# Patient Record
Sex: FEMALE | Race: WHITE
[De-identification: names, ages, dates, MRNs, and addresses within clinical notes are randomized per-mention and may not be internally consistent; named-entity substitution may affect disease eponyms.]

---

## 2018-05-07 ENCOUNTER — HOSPITAL ENCOUNTER (OUTPATIENT)
Dept: HOSPITAL 8 - STAR | Age: 38
End: 2018-05-07
Attending: OBSTETRICS & GYNECOLOGY
Payer: COMMERCIAL

## 2018-05-07 VITALS — DIASTOLIC BLOOD PRESSURE: 59 MMHG | SYSTOLIC BLOOD PRESSURE: 97 MMHG

## 2018-05-07 DIAGNOSIS — Z01.818: Primary | ICD-10-CM

## 2018-05-07 LAB
BASOPHILS # BLD AUTO: 0.01 X10^3/UL (ref 0–0.1)
BASOPHILS NFR BLD AUTO: 0 % (ref 0–1)
CULTURE INDICATED?: YES
EOSINOPHIL # BLD AUTO: 0.1 X10^3/UL (ref 0–0.4)
EOSINOPHIL NFR BLD AUTO: 1 % (ref 1–7)
ERYTHROCYTE [DISTWIDTH] IN BLOOD BY AUTOMATED COUNT: 12.7 % (ref 9.6–15.2)
HCG UR SG: 1.01 (ref 1–1.03)
LYMPHOCYTES # BLD AUTO: 1.78 X10^3/UL (ref 1–3.4)
LYMPHOCYTES NFR BLD AUTO: 23 % (ref 22–44)
MCH RBC QN AUTO: 29.6 PG (ref 27–34.8)
MCHC RBC AUTO-ENTMCNC: 33.6 G/DL (ref 32.4–35.8)
MCV RBC AUTO: 88.1 FL (ref 80–100)
MD: NO
MICROSCOPIC: (no result)
MONOCYTES # BLD AUTO: 0.6 X10^3/UL (ref 0.2–0.8)
MONOCYTES NFR BLD AUTO: 8 % (ref 2–9)
NEUTROPHILS # BLD AUTO: 5.17 X10^3/UL (ref 1.8–6.8)
NEUTROPHILS NFR BLD AUTO: 68 % (ref 42–75)
PLATELET # BLD AUTO: 223 X10^3/UL (ref 130–400)
PMV BLD AUTO: 8.1 FL (ref 7.4–10.4)
RBC # BLD AUTO: 4.98 X10^6/UL (ref 3.82–5.3)

## 2018-05-07 PROCEDURE — 85025 COMPLETE CBC W/AUTO DIFF WBC: CPT

## 2018-05-07 PROCEDURE — 81001 URINALYSIS AUTO W/SCOPE: CPT

## 2018-05-07 PROCEDURE — 36415 COLL VENOUS BLD VENIPUNCTURE: CPT

## 2018-05-07 PROCEDURE — 87086 URINE CULTURE/COLONY COUNT: CPT

## 2018-05-07 PROCEDURE — 81025 URINE PREGNANCY TEST: CPT

## 2018-05-17 ENCOUNTER — HOSPITAL ENCOUNTER (OUTPATIENT)
Dept: HOSPITAL 8 - OUT | Age: 38
Discharge: HOME | End: 2018-05-17
Attending: OBSTETRICS & GYNECOLOGY
Payer: COMMERCIAL

## 2018-05-17 VITALS — BODY MASS INDEX: 26.02 KG/M2 | WEIGHT: 175.71 LBS | HEIGHT: 69 IN

## 2018-05-17 DIAGNOSIS — N80.9: ICD-10-CM

## 2018-05-17 DIAGNOSIS — N92.0: Primary | ICD-10-CM

## 2018-05-17 DIAGNOSIS — N94.6: ICD-10-CM

## 2018-05-17 PROCEDURE — 85014 HEMATOCRIT: CPT

## 2018-05-17 PROCEDURE — 36415 COLL VENOUS BLD VENIPUNCTURE: CPT

## 2018-05-17 PROCEDURE — 85018 HEMOGLOBIN: CPT

## 2018-05-17 PROCEDURE — 86900 BLOOD TYPING SEROLOGIC ABO: CPT

## 2018-05-17 PROCEDURE — 88307 TISSUE EXAM BY PATHOLOGIST: CPT

## 2018-05-17 PROCEDURE — 58552 LAPARO-VAG HYST INCL T/O: CPT

## 2018-05-17 PROCEDURE — 86850 RBC ANTIBODY SCREEN: CPT

## 2022-05-05 ENCOUNTER — HOSPITAL ENCOUNTER (OUTPATIENT)
Dept: LAB | Facility: MEDICAL CENTER | Age: 42
End: 2022-05-05
Attending: FAMILY MEDICINE
Payer: COMMERCIAL

## 2022-05-05 LAB
ALBUMIN SERPL BCP-MCNC: 4.4 G/DL (ref 3.2–4.9)
ALBUMIN/GLOB SERPL: 1.6 G/DL
ALP SERPL-CCNC: 80 U/L (ref 30–99)
ALT SERPL-CCNC: 15 U/L (ref 2–50)
ANION GAP SERPL CALC-SCNC: 10 MMOL/L (ref 7–16)
AST SERPL-CCNC: 16 U/L (ref 12–45)
BASOPHILS # BLD AUTO: 0.7 % (ref 0–1.8)
BASOPHILS # BLD: 0.04 K/UL (ref 0–0.12)
BILIRUB SERPL-MCNC: 0.6 MG/DL (ref 0.1–1.5)
BUN SERPL-MCNC: 13 MG/DL (ref 8–22)
CALCIUM SERPL-MCNC: 9.1 MG/DL (ref 8.5–10.5)
CHLORIDE SERPL-SCNC: 106 MMOL/L (ref 96–112)
CO2 SERPL-SCNC: 23 MMOL/L (ref 20–33)
CREAT SERPL-MCNC: 0.8 MG/DL (ref 0.5–1.4)
EOSINOPHIL # BLD AUTO: 0.1 K/UL (ref 0–0.51)
EOSINOPHIL NFR BLD: 1.7 % (ref 0–6.9)
ERYTHROCYTE [DISTWIDTH] IN BLOOD BY AUTOMATED COUNT: 41.3 FL (ref 35.9–50)
GFR SERPLBLD CREATININE-BSD FMLA CKD-EPI: 95 ML/MIN/1.73 M 2
GLOBULIN SER CALC-MCNC: 2.7 G/DL (ref 1.9–3.5)
GLUCOSE SERPL-MCNC: 71 MG/DL (ref 65–99)
HCT VFR BLD AUTO: 46.3 % (ref 37–47)
HGB BLD-MCNC: 15.5 G/DL (ref 12–16)
IMM GRANULOCYTES # BLD AUTO: 0.02 K/UL (ref 0–0.11)
IMM GRANULOCYTES NFR BLD AUTO: 0.3 % (ref 0–0.9)
LYMPHOCYTES # BLD AUTO: 1.8 K/UL (ref 1–4.8)
LYMPHOCYTES NFR BLD: 30.7 % (ref 22–41)
MCH RBC QN AUTO: 29.4 PG (ref 27–33)
MCHC RBC AUTO-ENTMCNC: 33.5 G/DL (ref 33.6–35)
MCV RBC AUTO: 87.9 FL (ref 81.4–97.8)
MONOCYTES # BLD AUTO: 0.48 K/UL (ref 0–0.85)
MONOCYTES NFR BLD AUTO: 8.2 % (ref 0–13.4)
NEUTROPHILS # BLD AUTO: 3.43 K/UL (ref 2–7.15)
NEUTROPHILS NFR BLD: 58.4 % (ref 44–72)
NRBC # BLD AUTO: 0 K/UL
NRBC BLD-RTO: 0 /100 WBC
PLATELET # BLD AUTO: 250 K/UL (ref 164–446)
PMV BLD AUTO: 10.1 FL (ref 9–12.9)
POTASSIUM SERPL-SCNC: 4.3 MMOL/L (ref 3.6–5.5)
PROT SERPL-MCNC: 7.1 G/DL (ref 6–8.2)
RBC # BLD AUTO: 5.27 M/UL (ref 4.2–5.4)
SODIUM SERPL-SCNC: 139 MMOL/L (ref 135–145)
WBC # BLD AUTO: 5.9 K/UL (ref 4.8–10.8)

## 2022-05-05 PROCEDURE — 85025 COMPLETE CBC W/AUTO DIFF WBC: CPT

## 2022-05-05 PROCEDURE — 83013 H PYLORI (C-13) BREATH: CPT

## 2022-05-05 PROCEDURE — 36415 COLL VENOUS BLD VENIPUNCTURE: CPT

## 2022-05-05 PROCEDURE — 80053 COMPREHEN METABOLIC PANEL: CPT

## 2022-05-06 LAB — UREA BREATH TEST QL: NEGATIVE

## 2024-05-10 ENCOUNTER — OFFICE VISIT (OUTPATIENT)
Dept: MEDICAL GROUP | Facility: LAB | Age: 44
End: 2024-05-10
Payer: COMMERCIAL

## 2024-05-10 VITALS
BODY MASS INDEX: 30.21 KG/M2 | WEIGHT: 204 LBS | HEART RATE: 76 BPM | OXYGEN SATURATION: 99 % | RESPIRATION RATE: 16 BRPM | TEMPERATURE: 98.2 F | SYSTOLIC BLOOD PRESSURE: 100 MMHG | HEIGHT: 69 IN | DIASTOLIC BLOOD PRESSURE: 62 MMHG

## 2024-05-10 DIAGNOSIS — E66.09 CLASS 1 OBESITY DUE TO EXCESS CALORIES WITHOUT SERIOUS COMORBIDITY WITH BODY MASS INDEX (BMI) OF 30.0 TO 30.9 IN ADULT: ICD-10-CM

## 2024-05-10 DIAGNOSIS — Z76.89 ENCOUNTER TO ESTABLISH CARE WITH NEW DOCTOR: ICD-10-CM

## 2024-05-10 DIAGNOSIS — Z23 NEED FOR VACCINATION: ICD-10-CM

## 2024-05-10 DIAGNOSIS — Z13.29 SCREENING FOR THYROID DISORDER: ICD-10-CM

## 2024-05-10 DIAGNOSIS — T75.3XXA MOTION SICKNESS, INITIAL ENCOUNTER: ICD-10-CM

## 2024-05-10 DIAGNOSIS — Z11.4 SCREENING FOR HIV WITHOUT PRESENCE OF RISK FACTORS: ICD-10-CM

## 2024-05-10 DIAGNOSIS — Z13.0 SCREENING FOR DEFICIENCY ANEMIA: ICD-10-CM

## 2024-05-10 DIAGNOSIS — R61 EXCESSIVE SWEATING: ICD-10-CM

## 2024-05-10 DIAGNOSIS — Z12.31 ENCOUNTER FOR SCREENING MAMMOGRAM FOR MALIGNANT NEOPLASM OF BREAST: ICD-10-CM

## 2024-05-10 DIAGNOSIS — Z11.59 ENCOUNTER FOR HEPATITIS C SCREENING TEST FOR LOW RISK PATIENT: ICD-10-CM

## 2024-05-10 DIAGNOSIS — Z13.220 SCREENING FOR HYPERLIPIDEMIA: ICD-10-CM

## 2024-05-10 PROBLEM — E66.811 CLASS 1 OBESITY DUE TO EXCESS CALORIES WITHOUT SERIOUS COMORBIDITY WITH BODY MASS INDEX (BMI) OF 30.0 TO 30.9 IN ADULT: Status: ACTIVE | Noted: 2024-05-10

## 2024-05-10 PROCEDURE — 3074F SYST BP LT 130 MM HG: CPT | Performed by: FAMILY MEDICINE

## 2024-05-10 PROCEDURE — 99204 OFFICE O/P NEW MOD 45 MIN: CPT | Mod: 25 | Performed by: FAMILY MEDICINE

## 2024-05-10 PROCEDURE — 3078F DIAST BP <80 MM HG: CPT | Performed by: FAMILY MEDICINE

## 2024-05-10 PROCEDURE — 90715 TDAP VACCINE 7 YRS/> IM: CPT | Performed by: FAMILY MEDICINE

## 2024-05-10 PROCEDURE — 90471 IMMUNIZATION ADMIN: CPT | Performed by: FAMILY MEDICINE

## 2024-05-10 RX ORDER — SCOLOPAMINE TRANSDERMAL SYSTEM 1 MG/1
1 PATCH, EXTENDED RELEASE TRANSDERMAL
Qty: 4 PATCH | Refills: 3 | Status: SHIPPED | OUTPATIENT
Start: 2024-05-10

## 2024-05-10 RX ORDER — ALUMINUM CHLORIDE 20 %
1 SOLUTION, NON-ORAL TOPICAL NIGHTLY
Qty: 60 ML | Refills: 0 | Status: SHIPPED | OUTPATIENT
Start: 2024-05-10

## 2024-05-10 NOTE — PROGRESS NOTES
Chief Complaint   Patient presents with    [facility]       Verbal consent was acquired by the patient to use [employer] ambient listening note generation during this visit Yes      HPI: New patient  History of Present Illness  The patient is a 43-year-old female who presents to [facility].    The patient, previously under the care of Dr. Eyad Pinzon, expresses a desire to discuss her weight.     She reports no current medical issues and is not currently on any prescribed medications. She has no known drug allergies. Her surgical history includes a partial hysterectomy in 2017 or 2018 due to heavy menstrual cycles, with no other surgeries reported. She has not undergone any recent laboratory tests. Her obstetric history includes four pregnancies in her lifetime, with three children aged 18, 16, and 12.     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 She maintains a healthy diet, consumes approximately 80 ounces of water daily, and consumes a high-protein diet.     Her last Pap smear was conducted within the last three months by Dr. Kyraa Hernandez, who has since retired. She is uncertain about the date of her last menstrual period. She has a history of chickenpox.    The patient suffers from hyperhidrosis and has attempted various treatments including deodorants, Drysol, DuoDERMite with elementum, aluminum-free, and medicated wipes, and Botox in her armpits, which provided minimal relief. Her primary concern at present is excessive sweating, necessitating the use of paper towels beneath her bra while teaching. She has previously consulted a dermatologist.    The patient is planning a cruise in 06/2024 and experiences severe motion sickness, for which she requests a prescription for a scopolamine patch.    The patient has a longstanding lymph  "node, which has decreased in size from the size of a quarter to the lowest it has ever been. It occasionally increases in size during illness.   The patient denies smoking, vaping, and alcohol intake.     She is an . She is  and has a partner.   Her father has Heart disease runs on her father's side of the family.   She thinks it is partly hereditary. Her father had 2 heart attacks. Her grandmother  of a heart attack.    The patient has no known allergies.              Exam:     /62 (BP Location: Left arm, Patient Position: Sitting, BP Cuff Size: Adult)   Pulse 76   Temp 36.8 °C (98.2 °F) (Temporal)   Resp 16   Ht 1.753 m (5' 9\")   Wt 92.5 kg (204 lb)   SpO2 99%   BMI 30.13 kg/m²   Gen.: Well-developed, well-nourished, no apparent distress, pleasant and cooperative with the examination  HEENT: Normocephalic/atraumatic, sinuses nontender with palpation, TMs clear, nares patent with pink mucosa and clear rhinorrhea, oropharynx clear  Neck: No JVD or bruits, no adenopathy  Cor: Regular rate and rhythm without murmur gallop or rub  Lungs: Clear to auscultation with equal breath sounds bilaterally. No wheezes, rhonchi.  Abdomen: Soft nontender without hepatosplenomegaly or masses appreciated, normoactive bowel sounds  Extremities: No cyanosis, clubbing or edema       Assessment & Plan    1. Encounter to establish care with new doctor  A comprehensive risk assessment, laboratory tests, and screening for anemia and thyroid will be ordered. The patient will receive her Tdap vaccine today. A referral for a mammogram will be initiated.    2. Class 1 obesity due to excess calories without serious comorbidity with body mass index (BMI) of 30.0 to 30.9 in adult  Chronic   The patient's BMI is recorded at 30. The patient was advised to reduce her BMI below 27. A weight management visit will be scheduled. She will contact her insurance provider regarding weight loss medications.    3. " Excessive sweating  Drysol solution ordered today, discussed side effects    4. Motion sickness,   Planning for cruise, scopolamine patch will be sent to her pharmacy.    5. Need for vaccination    - Tdap =>6yo IM    6. Screening for hyperlipidemia    - Comp Metabolic Panel; Future  - Lipid Profile; Future    7. Screening for deficiency anemia    - CBC WITH DIFFERENTIAL; Future    8. Encounter for hepatitis C screening test for low risk patient    - HEP C VIRUS ANTIBODY; Future    9. Screening for HIV without presence of risk factors    - HIV AG/AB COMBO ASSAY SCREENING; Future    10. Screening for thyroid disorder    - TSH WITH REFLEX TO FT4; Future    11. Encounter for screening mammogram for malignant neoplasm of breast    - MA-SCREENING MAMMO BILAT W/TOMOSYNTHESIS W/CAD; Future        Follow-up  The patient is scheduled for a follow-up visit in 1 month.    Please note that this dictation was created using voice recognition software. I have worked with consultants from the vendor as well as technical experts from [facility] [employer] to optimize the interface. I have made every reasonable attempt to correct obvious errors, but I expect that there are errors of grammar and possibly content that I did not discover before finalizing the note.

## 2024-05-13 ENCOUNTER — APPOINTMENT (OUTPATIENT)
Dept: MEDICAL GROUP | Facility: LAB | Age: 44
End: 2024-05-13
Payer: COMMERCIAL

## 2024-05-16 ENCOUNTER — OFFICE VISIT (OUTPATIENT)
Dept: MEDICAL GROUP | Facility: LAB | Age: 44
End: 2024-05-16
Payer: COMMERCIAL

## 2024-05-16 VITALS — WEIGHT: 204 LBS | HEIGHT: 69 IN | BODY MASS INDEX: 30.21 KG/M2

## 2024-05-16 DIAGNOSIS — E66.09 CLASS 1 OBESITY DUE TO EXCESS CALORIES WITHOUT SERIOUS COMORBIDITY WITH BODY MASS INDEX (BMI) OF 30.0 TO 30.9 IN ADULT: ICD-10-CM

## 2024-05-16 PROCEDURE — 99213 OFFICE O/P EST LOW 20 MIN: CPT | Performed by: FAMILY MEDICINE

## 2024-05-16 NOTE — PROGRESS NOTES
"Chief Complaint   Patient presents with    Weight Check     initial       Verbal consent was acquired by the patient to use [employer] ambient listening note generation during this visit Yes      HPI:Established patient  History of Present Illness  The patient is a 53-year-old female who presents for evaluation of weight gain.  This is first initial visit for weight management.    The patient reports a lifelong history of weight fluctuations. Following her first pregnancy, she experienced  labor at 26 weeks, necessitating bedrest for the majority of her pregnancy. Despite a desire to lose 10 to 15 pounds, she began to regain weight approximately 10 years ago. Following a partial hysterectomy, she noticed a gradual increase in her weight. Despite not participating in any medical weight loss programs, she has attempted weight loss through Weight Watchers, counting macros, regular exercise, CrossFit, and high-protein diet. Her daily diet consists of 1 to 2 portions of carbohydrates and 120 g of protein, supplemented with protein substitutes such as Premier shakes. Her daily caloric intake ranges from 1850 to 173. Her daily exercise routine includes 10,000 steps, hydration, and weightlifting. She maintains an active lifestyle, attending the gym on  and , performing cardio and weightlifting exercises. She denies any specific dietary restrictions or symptoms of sleep apnea.              Exam:     Ht 1.753 m (5' 9\")   Wt 92.5 kg (204 lb) Comment: shirt only- afternoon  BMI 30.13 kg/m²   Gen.: Well-developed, well-nourished, no apparent distress, pleasant and cooperative with the examination  HEENT: Normocephalic/atraumatic, sinuses nontender with palpation, TMs clear, nares patent with pink mucosa and clear rhinorrhea, oropharynx clear  Neck: No JVD or bruits, no adenopathy  Cor: Regular rate and rhythm without murmur gallop or rub  Lungs: Clear to auscultation with equal breath sounds bilaterally. " No wheezes, rhonchi.  Abdomen: Soft nontender without hepatosplenomegaly or masses appreciated, normoactive bowel sounds  Extremities: No cyanosis, clubbing or edema     Assessment & Plan      1. Class 1 obesity due to excess calories without serious comorbidity with body mass index (BMI) of 30.0 to 30.9 in adult     based on her Basal metabolic rate, is approximately 1650Kcal /day . The patient is advised to reduce her caloric intake to 1200 to 1500 calories per day, but not to exceed 1500 misty per day. She is encouraged to maintain her exercise regimen, adhere to a high-protein diet, high-fiber diet, and consume ample non-starchy vegetables. Additionally, she is recommended to incorporate vitamin D and B complex into her regimen. She is encouraged to consume more than 64 ounces of water daily. Furthermore, she is encouraged to engage in more than 150 minutes of exercise per week.    Follow-up  The patient is scheduled for a follow-up visit in 2 weeks to monitor her weight.

## 2024-05-29 ENCOUNTER — TELEPHONE (OUTPATIENT)
Dept: MEDICAL GROUP | Facility: LAB | Age: 44
End: 2024-05-29
Payer: COMMERCIAL

## 2024-05-29 NOTE — TELEPHONE ENCOUNTER
Patient still waiting to hear regarding the request to fill the above referenced script.    Pharmacy:  [facility]    Contact #[phone]

## 2024-05-30 ENCOUNTER — OFFICE VISIT (OUTPATIENT)
Dept: MEDICAL GROUP | Facility: LAB | Age: 44
End: 2024-05-30
Payer: COMMERCIAL

## 2024-05-30 VITALS
RESPIRATION RATE: 16 BRPM | HEART RATE: 98 BPM | BODY MASS INDEX: 29.62 KG/M2 | SYSTOLIC BLOOD PRESSURE: 110 MMHG | DIASTOLIC BLOOD PRESSURE: 70 MMHG | TEMPERATURE: 98 F | HEIGHT: 69 IN | WEIGHT: 200 LBS | OXYGEN SATURATION: 98 %

## 2024-05-30 DIAGNOSIS — E66.09 CLASS 1 OBESITY DUE TO EXCESS CALORIES WITHOUT SERIOUS COMORBIDITY WITH BODY MASS INDEX (BMI) OF 30.0 TO 30.9 IN ADULT: ICD-10-CM

## 2024-05-31 NOTE — PROGRESS NOTES
"Chief Complaint   Patient presents with    Weight Check       Verbal consent was acquired by the patient to use [employer] ambient listening note generation during this visit Yes      HPI:  History of Present Illness  The patient is a 43-year-old female who presents for evaluation of weight management.    Overweight/weight management.  Patient dropped around 4 pounds since last visit by applying the lifestyle changes dietary restrictions high-protein diet, and high water intake along with calorie deficiency.  Was not able to get her GLP-1 agonist medication yet but she would like to have a prescription just to be sent to [facility] pharmacy because she called them and she can afford the generic price of the semaglutide.    The patient is planning a 2.5-week family vacation to [facility] at the end of 06/2024 and is seeking advice on whether she should adhere to her prescribed medication, specifically semaglutide, during her vacation. She expresses concern about the coldness of her medication, particularly during her work hours.              Exam:     /70 (BP Location: Left arm, Patient Position: Sitting, BP Cuff Size: Adult)   Pulse 98   Temp 36.7 °C (98 °F) (Temporal)   Resp 16   Ht 1.753 m (5' 9\")   Wt 90.7 kg (200 lb)   SpO2 98%   BMI 29.53 kg/m²   Gen.: Well-developed, well-nourished, no apparent distress, pleasant and cooperative with the examination  HEENT: Normocephalic/atraumatic, sinuses nontender with palpation, TMs clear, nares patent with pink mucosa and clear rhinorrhea, oropharynx clear  Neck: No JVD or bruits, no adenopathy  Cor: Regular rate and rhythm without murmur gallop or rub  Lungs: Clear to auscultation with equal breath sounds bilaterally. No wheezes, rhonchi.  Abdomen: Soft nontender without hepatosplenomegaly or masses appreciated, normoactive bowel sounds  Extremities: No cyanosis, clubbing or edema     Assessment & Plan    1. Class 1 obesity due to excess calories " without serious comorbidity with body mass index (BMI) of 30.0 to 30.9 in adult  Improved to overweight BMI now patient lost around 4 pounds.  Advised to continue current regimen along with starting semaglutide, patient can skip for 2 weeks because of concerns of steroids and keeping the medication in cool place.  And resume after coming back with the same dose of 0.25 mg weekly and will increase gradually every 4 weeks.  Her laboratory results are satisfactory. A 2-week break from semaglutide was recommended. Dietary modifications, including portion control, high-protein diet, increased water intake, regular exercise, and walking, were recommended.    Follow-up  A follow-up appointment is scheduled for 1 month from now.

## 2024-06-07 ENCOUNTER — HOSPITAL ENCOUNTER (OUTPATIENT)
Dept: RADIOLOGY | Facility: MEDICAL CENTER | Age: 44
End: 2024-06-07
Attending: FAMILY MEDICINE
Payer: COMMERCIAL

## 2024-06-07 DIAGNOSIS — Z12.31 ENCOUNTER FOR SCREENING MAMMOGRAM FOR MALIGNANT NEOPLASM OF BREAST: ICD-10-CM

## 2024-06-07 PROCEDURE — 77063 BREAST TOMOSYNTHESIS BI: CPT

## 2024-06-11 DIAGNOSIS — R92.8 ABNORMAL MAMMOGRAM: ICD-10-CM

## 2024-06-20 ENCOUNTER — HOSPITAL ENCOUNTER (OUTPATIENT)
Dept: RADIOLOGY | Facility: MEDICAL CENTER | Age: 44
End: 2024-06-20
Attending: FAMILY MEDICINE
Payer: COMMERCIAL

## 2024-06-20 DIAGNOSIS — R92.8 ABNORMAL MAMMOGRAM: ICD-10-CM

## 2024-06-20 PROCEDURE — G0279 TOMOSYNTHESIS, MAMMO: HCPCS

## 2024-07-10 ENCOUNTER — HOSPITAL ENCOUNTER (OUTPATIENT)
Dept: RADIOLOGY | Facility: MEDICAL CENTER | Age: 44
End: 2024-07-10
Attending: FAMILY MEDICINE
Payer: COMMERCIAL

## 2024-07-10 DIAGNOSIS — R92.8 ABNORMAL FINDINGS ON DIAGNOSTIC IMAGING OF BREAST: ICD-10-CM

## 2024-07-10 LAB — PATHOLOGY CONSULT NOTE: NORMAL

## 2024-07-10 PROCEDURE — A4648 IMPLANTABLE TISSUE MARKER: HCPCS

## 2024-07-10 PROCEDURE — 88360 TUMOR IMMUNOHISTOCHEM/MANUAL: CPT

## 2024-07-10 PROCEDURE — 88305 TISSUE EXAM BY PATHOLOGIST: CPT

## 2024-07-11 ENCOUNTER — TELEPHONE (OUTPATIENT)
Dept: RADIOLOGY | Facility: MEDICAL CENTER | Age: 44
End: 2024-07-11
Payer: COMMERCIAL

## 2024-07-12 DIAGNOSIS — C50.912 MALIGNANT NEOPLASM OF LEFT BREAST IN FEMALE, ESTROGEN RECEPTOR POSITIVE, UNSPECIFIED SITE OF BREAST (HCC): ICD-10-CM

## 2024-07-12 DIAGNOSIS — Z17.0 MALIGNANT NEOPLASM OF LEFT BREAST IN FEMALE, ESTROGEN RECEPTOR POSITIVE, UNSPECIFIED SITE OF BREAST (HCC): ICD-10-CM

## 2024-07-12 DIAGNOSIS — D05.92 CARCINOMA IN SITU OF LEFT BREAST, UNSPECIFIED TYPE: ICD-10-CM

## 2024-07-22 ENCOUNTER — PATIENT OUTREACH (OUTPATIENT)
Dept: ONCOLOGY | Facility: MEDICAL CENTER | Age: 44
End: 2024-07-22
Payer: COMMERCIAL

## 2024-07-25 ENCOUNTER — OFFICE VISIT (OUTPATIENT)
Dept: SURGERY | Facility: MEDICAL CENTER | Age: 44
End: 2024-07-25
Payer: COMMERCIAL

## 2024-07-25 ENCOUNTER — TELEPHONE (OUTPATIENT)
Dept: SURGERY | Facility: MEDICAL CENTER | Age: 44
End: 2024-07-25

## 2024-07-25 VITALS
HEIGHT: 69 IN | DIASTOLIC BLOOD PRESSURE: 72 MMHG | BODY MASS INDEX: 28.29 KG/M2 | TEMPERATURE: 97.7 F | WEIGHT: 191 LBS | OXYGEN SATURATION: 98 % | SYSTOLIC BLOOD PRESSURE: 102 MMHG | HEART RATE: 89 BPM

## 2024-07-25 DIAGNOSIS — D05.12 DUCTAL CARCINOMA IN SITU (DCIS) OF LEFT BREAST: ICD-10-CM

## 2024-07-25 PROCEDURE — 99205 OFFICE O/P NEW HI 60 MIN: CPT | Performed by: SURGERY

## 2024-07-25 PROCEDURE — 3074F SYST BP LT 130 MM HG: CPT | Performed by: SURGERY

## 2024-07-25 PROCEDURE — G2212 PROLONG OUTPT/OFFICE VIS: HCPCS | Performed by: SURGERY

## 2024-07-25 PROCEDURE — 3078F DIAST BP <80 MM HG: CPT | Performed by: SURGERY

## 2024-07-25 ASSESSMENT — ENCOUNTER SYMPTOMS
EYES NEGATIVE: 1
MUSCULOSKELETAL NEGATIVE: 1
GASTROINTESTINAL NEGATIVE: 1
NEUROLOGICAL NEGATIVE: 1
CARDIOVASCULAR NEGATIVE: 1
PSYCHIATRIC NEGATIVE: 1
CONSTITUTIONAL NEGATIVE: 1
RESPIRATORY NEGATIVE: 1

## 2024-07-26 ENCOUNTER — TELEPHONE (OUTPATIENT)
Dept: SURGERY | Facility: MEDICAL CENTER | Age: 44
End: 2024-07-26
Payer: COMMERCIAL

## 2024-07-29 ENCOUNTER — PATIENT OUTREACH (OUTPATIENT)
Dept: ONCOLOGY | Facility: MEDICAL CENTER | Age: 44
End: 2024-07-29
Payer: COMMERCIAL

## 2024-07-30 ENCOUNTER — TELEPHONE (OUTPATIENT)
Dept: SURGERY | Facility: MEDICAL CENTER | Age: 44
End: 2024-07-30
Payer: COMMERCIAL

## 2024-08-02 ENCOUNTER — APPOINTMENT (OUTPATIENT)
Dept: ADMISSIONS | Facility: MEDICAL CENTER | Age: 44
End: 2024-08-02
Attending: PLASTIC SURGERY
Payer: COMMERCIAL

## 2024-08-06 ENCOUNTER — NON-PROVIDER VISIT (OUTPATIENT)
Dept: GENETICS | Facility: MEDICAL CENTER | Age: 44
End: 2024-08-06
Payer: COMMERCIAL

## 2024-08-06 NOTE — PROGRESS NOTES
Valeria Mason is a 43 y.o. female here for a non-provider visit for: Lab Draws  on 8/6/2024 at 8:56 AM    Procedure Performed: Venipuncture     Anatomical site: Left Antecubital Area (AC)    Equipment used: 25 butterfly    Labs drawn: Adormo (two lavender EDTA tubes)    Ordering Provider: Wizzgo     Jed By: Kalie Munroe, Med Ass't

## 2024-08-08 ENCOUNTER — PRE-ADMISSION TESTING (OUTPATIENT)
Dept: ADMISSIONS | Facility: MEDICAL CENTER | Age: 44
End: 2024-08-08
Attending: PLASTIC SURGERY
Payer: COMMERCIAL

## 2024-08-12 ENCOUNTER — PRE-ADMISSION TESTING (OUTPATIENT)
Dept: ADMISSIONS | Facility: MEDICAL CENTER | Age: 44
End: 2024-08-12
Attending: PLASTIC SURGERY
Payer: COMMERCIAL

## 2024-08-12 ENCOUNTER — DOCUMENTATION (OUTPATIENT)
Dept: SURGERY | Facility: MEDICAL CENTER | Age: 44
End: 2024-08-12
Payer: COMMERCIAL

## 2024-08-12 DIAGNOSIS — Z01.812 PRE-OPERATIVE LABORATORY EXAMINATION: ICD-10-CM

## 2024-08-12 LAB
ANION GAP SERPL CALC-SCNC: 10 MMOL/L (ref 7–16)
BUN SERPL-MCNC: 14 MG/DL (ref 8–22)
CALCIUM SERPL-MCNC: 8.9 MG/DL (ref 8.5–10.5)
CHLORIDE SERPL-SCNC: 106 MMOL/L (ref 96–112)
CO2 SERPL-SCNC: 23 MMOL/L (ref 20–33)
CREAT SERPL-MCNC: 0.95 MG/DL (ref 0.5–1.4)
ERYTHROCYTE [DISTWIDTH] IN BLOOD BY AUTOMATED COUNT: 41.4 FL (ref 35.9–50)
GFR SERPLBLD CREATININE-BSD FMLA CKD-EPI: 76 ML/MIN/1.73 M 2
GLUCOSE SERPL-MCNC: 76 MG/DL (ref 65–99)
HCT VFR BLD AUTO: 41.8 % (ref 37–47)
HGB BLD-MCNC: 14.3 G/DL (ref 12–16)
MCH RBC QN AUTO: 29.7 PG (ref 27–33)
MCHC RBC AUTO-ENTMCNC: 34.2 G/DL (ref 32.2–35.5)
MCV RBC AUTO: 86.7 FL (ref 81.4–97.8)
PLATELET # BLD AUTO: 226 K/UL (ref 164–446)
PMV BLD AUTO: 9.4 FL (ref 9–12.9)
POTASSIUM SERPL-SCNC: 4.5 MMOL/L (ref 3.6–5.5)
RBC # BLD AUTO: 4.82 M/UL (ref 4.2–5.4)
SODIUM SERPL-SCNC: 139 MMOL/L (ref 135–145)
WBC # BLD AUTO: 6.1 K/UL (ref 4.8–10.8)

## 2024-08-12 PROCEDURE — 80048 BASIC METABOLIC PNL TOTAL CA: CPT

## 2024-08-12 PROCEDURE — 85027 COMPLETE CBC AUTOMATED: CPT

## 2024-08-12 PROCEDURE — 36415 COLL VENOUS BLD VENIPUNCTURE: CPT

## 2024-08-14 ENCOUNTER — ANESTHESIA EVENT (OUTPATIENT)
Dept: SURGERY | Facility: MEDICAL CENTER | Age: 44
End: 2024-08-14
Payer: COMMERCIAL

## 2024-08-15 ENCOUNTER — HOSPITAL ENCOUNTER (OUTPATIENT)
Dept: RADIOLOGY | Facility: MEDICAL CENTER | Age: 44
End: 2024-08-15
Attending: SURGERY

## 2024-08-15 ENCOUNTER — HOSPITAL ENCOUNTER (OUTPATIENT)
Facility: MEDICAL CENTER | Age: 44
End: 2024-08-15
Attending: PLASTIC SURGERY | Admitting: PLASTIC SURGERY
Payer: COMMERCIAL

## 2024-08-15 ENCOUNTER — APPOINTMENT (OUTPATIENT)
Dept: RADIOLOGY | Facility: MEDICAL CENTER | Age: 44
End: 2024-08-15
Attending: SURGERY
Payer: COMMERCIAL

## 2024-08-15 ENCOUNTER — ANESTHESIA (OUTPATIENT)
Dept: SURGERY | Facility: MEDICAL CENTER | Age: 44
End: 2024-08-15
Payer: COMMERCIAL

## 2024-08-15 VITALS
RESPIRATION RATE: 20 BRPM | HEIGHT: 69 IN | SYSTOLIC BLOOD PRESSURE: 97 MMHG | TEMPERATURE: 97.2 F | DIASTOLIC BLOOD PRESSURE: 55 MMHG | HEART RATE: 72 BPM | OXYGEN SATURATION: 97 % | BODY MASS INDEX: 28.54 KG/M2 | WEIGHT: 192.68 LBS

## 2024-08-15 DIAGNOSIS — D05.12 INTRADUCTAL CARCINOMA IN SITU OF LEFT BREAST: ICD-10-CM

## 2024-08-15 DIAGNOSIS — D05.12 DUCTAL CARCINOMA IN SITU OF LEFT BREAST: ICD-10-CM

## 2024-08-15 LAB — PATHOLOGY CONSULT NOTE: NORMAL

## 2024-08-15 PROCEDURE — C1889 IMPLANT/INSERT DEVICE, NOC: HCPCS | Performed by: PLASTIC SURGERY

## 2024-08-15 PROCEDURE — 700101 HCHG RX REV CODE 250: Performed by: PLASTIC SURGERY

## 2024-08-15 PROCEDURE — C1762 CONN TISS, HUMAN(INC FASCIA): HCPCS | Performed by: PLASTIC SURGERY

## 2024-08-15 PROCEDURE — 160048 HCHG OR STATISTICAL LEVEL 1-5: Performed by: PLASTIC SURGERY

## 2024-08-15 PROCEDURE — 19303 MAST SIMPLE COMPLETE: CPT | Mod: AS,LT | Performed by: SPECIALIST

## 2024-08-15 PROCEDURE — 38900 IO MAP OF SENT LYMPH NODE: CPT | Mod: AS,LT | Performed by: SPECIALIST

## 2024-08-15 PROCEDURE — 700101 HCHG RX REV CODE 250: Performed by: ANESTHESIOLOGY

## 2024-08-15 PROCEDURE — 38525 BIOPSY/REMOVAL LYMPH NODES: CPT | Mod: AS,LT | Performed by: SPECIALIST

## 2024-08-15 PROCEDURE — 160025 RECOVERY II MINUTES (STATS): Performed by: PLASTIC SURGERY

## 2024-08-15 PROCEDURE — A9270 NON-COVERED ITEM OR SERVICE: HCPCS | Performed by: PLASTIC SURGERY

## 2024-08-15 PROCEDURE — A9270 NON-COVERED ITEM OR SERVICE: HCPCS | Performed by: ANESTHESIOLOGY

## 2024-08-15 PROCEDURE — 38525 BIOPSY/REMOVAL LYMPH NODES: CPT | Mod: LT | Performed by: SURGERY

## 2024-08-15 PROCEDURE — 160047 HCHG PACU  - EA ADDL 30 MINS PHASE II: Performed by: PLASTIC SURGERY

## 2024-08-15 PROCEDURE — 110371 HCHG SHELL REV 272: Performed by: PLASTIC SURGERY

## 2024-08-15 PROCEDURE — 700111 HCHG RX REV CODE 636 W/ 250 OVERRIDE (IP): Performed by: ANESTHESIOLOGY

## 2024-08-15 PROCEDURE — 38900 IO MAP OF SENT LYMPH NODE: CPT | Mod: LT | Performed by: SURGERY

## 2024-08-15 PROCEDURE — 160029 HCHG SURGERY MINUTES - 1ST 30 MINS LEVEL 4: Performed by: PLASTIC SURGERY

## 2024-08-15 PROCEDURE — 700111 HCHG RX REV CODE 636 W/ 250 OVERRIDE (IP): Performed by: SURGERY

## 2024-08-15 PROCEDURE — 700102 HCHG RX REV CODE 250 W/ 637 OVERRIDE(OP): Performed by: ANESTHESIOLOGY

## 2024-08-15 PROCEDURE — 19303 MAST SIMPLE COMPLETE: CPT | Mod: LT | Performed by: SURGERY

## 2024-08-15 PROCEDURE — 700105 HCHG RX REV CODE 258: Performed by: ANESTHESIOLOGY

## 2024-08-15 PROCEDURE — 160046 HCHG PACU - 1ST 60 MINS PHASE II: Performed by: PLASTIC SURGERY

## 2024-08-15 PROCEDURE — 38792 RA TRACER ID OF SENTINL NODE: CPT | Mod: LT

## 2024-08-15 PROCEDURE — 160041 HCHG SURGERY MINUTES - EA ADDL 1 MIN LEVEL 4: Performed by: PLASTIC SURGERY

## 2024-08-15 PROCEDURE — 160036 HCHG PACU - EA ADDL 30 MINS PHASE I: Performed by: PLASTIC SURGERY

## 2024-08-15 PROCEDURE — 700105 HCHG RX REV CODE 258: Performed by: PLASTIC SURGERY

## 2024-08-15 PROCEDURE — 160009 HCHG ANES TIME/MIN: Performed by: PLASTIC SURGERY

## 2024-08-15 PROCEDURE — 160035 HCHG PACU - 1ST 60 MINS PHASE I: Performed by: PLASTIC SURGERY

## 2024-08-15 PROCEDURE — 700102 HCHG RX REV CODE 250 W/ 637 OVERRIDE(OP): Performed by: PLASTIC SURGERY

## 2024-08-15 PROCEDURE — 700111 HCHG RX REV CODE 636 W/ 250 OVERRIDE (IP): Performed by: PLASTIC SURGERY

## 2024-08-15 PROCEDURE — 700111 HCHG RX REV CODE 636 W/ 250 OVERRIDE (IP): Mod: JZ | Performed by: ANESTHESIOLOGY

## 2024-08-15 PROCEDURE — 160002 HCHG RECOVERY MINUTES (STAT): Performed by: PLASTIC SURGERY

## 2024-08-15 PROCEDURE — 700104 HCHG RX REV CODE 254: Performed by: ANESTHESIOLOGY

## 2024-08-15 PROCEDURE — 76098 X-RAY EXAM SURGICAL SPECIMEN: CPT | Mod: LT

## 2024-08-15 DEVICE — GRAFT MESH ADM SHAPED ALLOMEND 10CM X 18CM 1.0-2.0MM (1EA): Type: IMPLANTABLE DEVICE | Site: BREAST | Status: FUNCTIONAL

## 2024-08-15 DEVICE — IMPLANTABLE DEVICE: Type: IMPLANTABLE DEVICE | Site: BREAST | Status: FUNCTIONAL

## 2024-08-15 RX ORDER — OXYCODONE HCL 5 MG/5 ML
5 SOLUTION, ORAL ORAL
Status: COMPLETED | OUTPATIENT
Start: 2024-08-15 | End: 2024-08-15

## 2024-08-15 RX ORDER — ALPRAZOLAM 0.25 MG
.25-.5 TABLET ORAL PRN
Status: DISCONTINUED | OUTPATIENT
Start: 2024-08-15 | End: 2024-08-15 | Stop reason: HOSPADM

## 2024-08-15 RX ORDER — CELECOXIB 200 MG/1
400 CAPSULE ORAL ONCE
Status: COMPLETED | OUTPATIENT
Start: 2024-08-15 | End: 2024-08-15

## 2024-08-15 RX ORDER — MIDAZOLAM HYDROCHLORIDE 1 MG/ML
INJECTION INTRAMUSCULAR; INTRAVENOUS PRN
Status: DISCONTINUED | OUTPATIENT
Start: 2024-08-15 | End: 2024-08-15 | Stop reason: SURG

## 2024-08-15 RX ORDER — SODIUM CHLORIDE, SODIUM LACTATE, POTASSIUM CHLORIDE, CALCIUM CHLORIDE 600; 310; 30; 20 MG/100ML; MG/100ML; MG/100ML; MG/100ML
INJECTION, SOLUTION INTRAVENOUS
Status: DISCONTINUED | OUTPATIENT
Start: 2024-08-15 | End: 2024-08-15 | Stop reason: SURG

## 2024-08-15 RX ORDER — DEXAMETHASONE SODIUM PHOSPHATE 4 MG/ML
INJECTION, SOLUTION INTRA-ARTICULAR; INTRALESIONAL; INTRAMUSCULAR; INTRAVENOUS; SOFT TISSUE PRN
Status: DISCONTINUED | OUTPATIENT
Start: 2024-08-15 | End: 2024-08-15 | Stop reason: SURG

## 2024-08-15 RX ORDER — LIDOCAINE/PRILOCAINE 2.5 %-2.5%
CREAM (GRAM) TOPICAL ONCE
Status: COMPLETED | OUTPATIENT
Start: 2024-08-15 | End: 2024-08-15

## 2024-08-15 RX ORDER — ISOSULFAN BLUE 50 MG/5ML
INJECTION, SOLUTION SUBCUTANEOUS
Status: DISCONTINUED | OUTPATIENT
Start: 2024-08-15 | End: 2024-08-15 | Stop reason: HOSPADM

## 2024-08-15 RX ORDER — ISOSULFAN BLUE 50 MG/5ML
INJECTION, SOLUTION SUBCUTANEOUS
Status: DISCONTINUED
Start: 2024-08-15 | End: 2024-08-15 | Stop reason: HOSPADM

## 2024-08-15 RX ORDER — CEFAZOLIN SODIUM 1 G/3ML
INJECTION, POWDER, FOR SOLUTION INTRAMUSCULAR; INTRAVENOUS PRN
Status: DISCONTINUED | OUTPATIENT
Start: 2024-08-15 | End: 2024-08-15 | Stop reason: SURG

## 2024-08-15 RX ORDER — OXYCODONE HCL 5 MG/5 ML
10 SOLUTION, ORAL ORAL
Status: COMPLETED | OUTPATIENT
Start: 2024-08-15 | End: 2024-08-15

## 2024-08-15 RX ORDER — CEFAZOLIN SODIUM 1 G/3ML
INJECTION, POWDER, FOR SOLUTION INTRAMUSCULAR; INTRAVENOUS
Status: DISCONTINUED
Start: 2024-08-15 | End: 2024-08-15 | Stop reason: HOSPADM

## 2024-08-15 RX ORDER — BUPIVACAINE HYDROCHLORIDE AND EPINEPHRINE 5; 5 MG/ML; UG/ML
INJECTION, SOLUTION EPIDURAL; INTRACAUDAL; PERINEURAL
Status: DISCONTINUED | OUTPATIENT
Start: 2024-08-15 | End: 2024-08-15 | Stop reason: HOSPADM

## 2024-08-15 RX ORDER — ONDANSETRON 2 MG/ML
4 INJECTION INTRAMUSCULAR; INTRAVENOUS
Status: DISCONTINUED | OUTPATIENT
Start: 2024-08-15 | End: 2024-08-15 | Stop reason: HOSPADM

## 2024-08-15 RX ORDER — LIDOCAINE HYDROCHLORIDE 20 MG/ML
INJECTION, SOLUTION EPIDURAL; INFILTRATION; INTRACAUDAL; PERINEURAL PRN
Status: DISCONTINUED | OUTPATIENT
Start: 2024-08-15 | End: 2024-08-15 | Stop reason: SURG

## 2024-08-15 RX ORDER — GENTAMICIN 40 MG/ML
INJECTION, SOLUTION INTRAMUSCULAR; INTRAVENOUS
Status: DISCONTINUED
Start: 2024-08-15 | End: 2024-08-15 | Stop reason: HOSPADM

## 2024-08-15 RX ORDER — ACETAMINOPHEN 500 MG
1000 TABLET ORAL ONCE
Status: COMPLETED | OUTPATIENT
Start: 2024-08-15 | End: 2024-08-15

## 2024-08-15 RX ORDER — DIPHENHYDRAMINE HYDROCHLORIDE 50 MG/ML
12.5 INJECTION INTRAMUSCULAR; INTRAVENOUS
Status: DISCONTINUED | OUTPATIENT
Start: 2024-08-15 | End: 2024-08-15 | Stop reason: HOSPADM

## 2024-08-15 RX ORDER — MEPERIDINE HYDROCHLORIDE 25 MG/ML
6.25 INJECTION INTRAMUSCULAR; INTRAVENOUS; SUBCUTANEOUS
Status: DISCONTINUED | OUTPATIENT
Start: 2024-08-15 | End: 2024-08-15 | Stop reason: HOSPADM

## 2024-08-15 RX ORDER — ONDANSETRON 2 MG/ML
INJECTION INTRAMUSCULAR; INTRAVENOUS PRN
Status: DISCONTINUED | OUTPATIENT
Start: 2024-08-15 | End: 2024-08-15 | Stop reason: SURG

## 2024-08-15 RX ORDER — HALOPERIDOL 5 MG/ML
1 INJECTION INTRAMUSCULAR
Status: DISCONTINUED | OUTPATIENT
Start: 2024-08-15 | End: 2024-08-15 | Stop reason: HOSPADM

## 2024-08-15 RX ORDER — BUPIVACAINE HYDROCHLORIDE AND EPINEPHRINE 5; 5 MG/ML; UG/ML
INJECTION, SOLUTION EPIDURAL; INTRACAUDAL; PERINEURAL
Status: DISCONTINUED
Start: 2024-08-15 | End: 2024-08-15 | Stop reason: HOSPADM

## 2024-08-15 RX ORDER — INDOCYANINE GREEN AND WATER 25 MG
KIT INJECTION PRN
Status: DISCONTINUED | OUTPATIENT
Start: 2024-08-15 | End: 2024-08-15 | Stop reason: SURG

## 2024-08-15 RX ORDER — GENTAMICIN 40 MG/ML
INJECTION, SOLUTION INTRAMUSCULAR; INTRAVENOUS
Status: DISCONTINUED | OUTPATIENT
Start: 2024-08-15 | End: 2024-08-15 | Stop reason: HOSPADM

## 2024-08-15 RX ADMIN — DEXAMETHASONE SODIUM PHOSPHATE 8 MG: 4 INJECTION INTRA-ARTICULAR; INTRALESIONAL; INTRAMUSCULAR; INTRAVENOUS; SOFT TISSUE at 11:51

## 2024-08-15 RX ADMIN — CELECOXIB 400 MG: 200 CAPSULE ORAL at 09:13

## 2024-08-15 RX ADMIN — ACETAMINOPHEN 1000 MG: 500 TABLET ORAL at 09:13

## 2024-08-15 RX ADMIN — FENTANYL CITRATE 50 MCG: 50 INJECTION, SOLUTION INTRAMUSCULAR; INTRAVENOUS at 14:07

## 2024-08-15 RX ADMIN — CEFAZOLIN 2 G: 1 INJECTION, POWDER, FOR SOLUTION INTRAMUSCULAR; INTRAVENOUS at 11:49

## 2024-08-15 RX ADMIN — FENTANYL CITRATE 50 MCG: 50 INJECTION, SOLUTION INTRAMUSCULAR; INTRAVENOUS at 12:08

## 2024-08-15 RX ADMIN — FENTANYL CITRATE 50 MCG: 50 INJECTION, SOLUTION INTRAMUSCULAR; INTRAVENOUS at 12:50

## 2024-08-15 RX ADMIN — MIDAZOLAM HYDROCHLORIDE 2 MG: 2 INJECTION, SOLUTION INTRAMUSCULAR; INTRAVENOUS at 11:42

## 2024-08-15 RX ADMIN — LIDOCAINE AND PRILOCAINE 1 APPLICATION: 25; 25 CREAM TOPICAL at 09:13

## 2024-08-15 RX ADMIN — ONDANSETRON 4 MG: 2 INJECTION INTRAMUSCULAR; INTRAVENOUS at 11:51

## 2024-08-15 RX ADMIN — SODIUM CHLORIDE, POTASSIUM CHLORIDE, SODIUM LACTATE AND CALCIUM CHLORIDE: 600; 310; 30; 20 INJECTION, SOLUTION INTRAVENOUS at 11:41

## 2024-08-15 RX ADMIN — FENTANYL CITRATE 25 MCG: 50 INJECTION, SOLUTION INTRAMUSCULAR; INTRAVENOUS at 11:53

## 2024-08-15 RX ADMIN — LIDOCAINE HYDROCHLORIDE 60 MG: 20 INJECTION, SOLUTION EPIDURAL; INFILTRATION; INTRACAUDAL at 11:44

## 2024-08-15 RX ADMIN — FENTANYL CITRATE 25 MCG: 50 INJECTION, SOLUTION INTRAMUSCULAR; INTRAVENOUS at 12:28

## 2024-08-15 RX ADMIN — FENTANYL CITRATE 25 MCG: 50 INJECTION, SOLUTION INTRAMUSCULAR; INTRAVENOUS at 13:39

## 2024-08-15 RX ADMIN — FENTANYL CITRATE 50 MCG: 50 INJECTION, SOLUTION INTRAMUSCULAR; INTRAVENOUS at 15:03

## 2024-08-15 RX ADMIN — FENTANYL CITRATE 50 MCG: 50 INJECTION, SOLUTION INTRAMUSCULAR; INTRAVENOUS at 14:49

## 2024-08-15 RX ADMIN — INDOCYANINE GREEN AND WATER 7.5 MG: KIT at 13:23

## 2024-08-15 RX ADMIN — OXYCODONE HYDROCHLORIDE 10 MG: 5 SOLUTION ORAL at 14:40

## 2024-08-15 RX ADMIN — FENTANYL CITRATE 25 MCG: 50 INJECTION, SOLUTION INTRAMUSCULAR; INTRAVENOUS at 11:44

## 2024-08-15 RX ADMIN — PROPOFOL 200 MG: 10 INJECTION, EMULSION INTRAVENOUS at 11:44

## 2024-08-15 RX ADMIN — ALPRAZOLAM 0.25 MG: 0.25 TABLET ORAL at 09:14

## 2024-08-15 ASSESSMENT — PAIN DESCRIPTION - PAIN TYPE
TYPE: SURGICAL PAIN
TYPE: ACUTE PAIN;SURGICAL PAIN
TYPE: SURGICAL PAIN
TYPE: ACUTE PAIN;SURGICAL PAIN

## 2024-08-15 ASSESSMENT — PAIN SCALES - GENERAL: PAIN_LEVEL: 4

## 2024-08-15 NOTE — ANESTHESIA PREPROCEDURE EVALUATION
Case: [id number] Date/Time: 08/15/24 1145    Procedures:       LEFT BREAST RECONSTRUCTION WITH PLACEMENT OF TISSUE EXPANDER AND USE OF ACELLULAR DERMAL MATRIX      INSERTION OR REMOVAL, TISSUE EXPANDER      LEFT TOTAL MASTECTOMY, LEFT SENTINEL LYMPH NODE INJECTION WITH EXCISION OF AXILLARY NODES      BIOPSY, LYMPH NODE, SENTINEL    Pre-op diagnosis: PERSONAL HISTORY OF MALIGNANT NEOPLASM OF BREAST, INTRADUCTAL CARCINOMA INSITU OF LEFT RBEAST, LEFT BREAST DCIS    Location: [facility] ROOM 28 / SURGERY SAME DAY [facility]    Surgeons: Bal Mora M.D.; Kallie Dasilva M.D.            Relevant Problems   ANESTHESIA   (positive) Motion sickness       Physical Exam    Airway   Mallampati: II  TM distance: >3 FB       Cardiovascular - normal exam     Dental    Pulmonary    Abdominal    Neurological            Anesthesia Plan    ASA 1       Plan - general       Airway plan will be LMA          Induction: intravenous    Postoperative Plan: Postoperative administration of opioids is intended.    Pertinent diagnostic labs and testing reviewed    Informed Consent:    Anesthetic plan and risks discussed with patient.    Use of blood products discussed with: whom consented to blood products.

## 2024-08-15 NOTE — OR NURSING
1441: report from michele fuentes ,prn pain medication provided.     1448 prn pain medication provided pt tolerating water.    1503 prn pain medication given     1600 pt resting     1645 assisted pt to personal clothing  and camisole, and into recliner     1658: Discharge instructions reviewed with patient and family member. All questions answered, verbalizes understanding. Mario drain instructions given to patient and demonstration in drain care. prevena instruction handout given to family.     1702: IV and ID bands removed. Pt then escorted to car via wheelchair, accompanied by RN. All personal belongings & discharge instructions with patient/family.

## 2024-08-15 NOTE — ANESTHESIA PROCEDURE NOTES
Airway    Date/Time: 8/15/2024 11:48 AM    Performed by: Suad Herrera M.D.  Authorized by: Suad Herrera M.D.    Location:  OR  Urgency:  Elective  Difficult Airway: No    Indications for Airway Management:  Anesthesia      Spontaneous Ventilation: absent    Sedation Level:  Deep  Preoxygenated: Yes    Patient Position:  Sniffing  MILS Maintained Throughout: No    Mask Difficulty Assessment:  0 - not attempted  Final Airway Type:  Supraglottic airway  Final Supraglottic Airway:  Standard LMA    SGA Size:  3  Number of Attempts at Approach:  1

## 2024-08-15 NOTE — OP REPORT
Operative Report    Date: 8/15/2024      Pre-operative Diagnosis: DCIS left breast    Post-operative Diagnosis: Same    Procedure:   Left total mastectomy   Left sentinel lymph node mapping with axillary reverse mapping and excision deep axillary node      Surgeon: Kallie Dasilva M.D.    Assistant: Lisa Collins PA-C    Anesthesiologist: Suad Herrera MD      Anesthesia:  General    Pre-Op Meds:  Ancef    ASA class:  2    Indications:   This is a 43 year old female with left breast DCIS, cTis cN0 M0.  After discussing risks and benefits of her options, she is ready to proceed with total mastectomy and axillary staging.    Findings:   Residual calcifications noted in the central inferior breast.  (No expected biopsy clip which did not deploy at time of biopsy).    [place] node with counts to 3700.  Background counts <10%.  Prepectoral TE/ADM including de-epithelialized inferior flap.      Summary:   The patient underwent radiotracer injection by Radiology, then was taken to the operating room.  She was anesthetized, prepped, and draped in sterile fashion.  Time out was confirmed.  For axillary reverse mapping, 3cc of isosulfan blue dye and 7cc of saline was injected into the upper inner arm, and lymphatic massage was performed.     Following the marks made by the plastic surgeon, I made Gonzales pattern incisions including excision of the nipple areolar complex.  Using the Photon blade, dissection was carried along the anterior mammary fascial plane to the infraclavicular region, the sternum, the latissimus dorsi, and the inframammary fold.  Fascia was included in the specimen and usual orienting sutures were placed.  I irrigated and ensured hemostasis.  Specimen mammogram was performed. Additional rim of breast tissue was excised from the central inferior flap (suture marks anterior) without damage to the vascular supply.       I then dissected through her clavipectoral fascia.  The gamma probe was used to locate  the deep axillary sentinel nodes.  These were excised using suture ligation to reapproximate the afferent and efferent lymphatics.  No blue arm lymphatics were encountered. There were no suspicious palpable nodes and background counts were less than 10%.  I irrigated and ensured hemostasis.  Dr. Mora completed his portion of the procedure which is dictated separately.  Assistance was required for retraction and help closing.           [place] Node Biopsy for Breast Cancer  Operation performed with curative intent Yes   Tracer(s) used to identify sentinel nodes in the upfront surgery (non-neoadjuvant) setting Radioactive tracer   Tracer(s) used to identify sentinel nodes in the neoadjuvant setting N/A   All nodes (colored or non-colored) present at the end of a dye-filled lymphatic channel were removed N/A   All significantly radioactive nodes were removed Yes   All palpably suspicious nodes were removed N/A   Biopsy-proven positive nodes marked with clips prior to chemotherapy were identified and removed N/A

## 2024-08-15 NOTE — OR SURGEON
Immediate Post OP Note    PreOp Diagnosis: Left breast ductal carcinoma in situ      PostOp Diagnosis: Same      Procedure(s):  LEFT BREAST RECONSTRUCTION WITH PLACEMENT OF TISSUE EXPANDER AND USE OF ACELLULAR DERMAL MATRIX - Wound Class: Clean with Drain  INSERTION, TISSUE EXPANDER - Wound Class: Clean with Drain  LEFT TOTAL MASTECTOMY, LEFT SENTINEL LYMPH NODE INJECTION WITH EXCISION OF AXILLARY NODES - Wound Class: Clean  BIOPSY, LYMPH NODE, SENTINEL - Wound Class: Clean with Drain    Surgeon(s):  LORAINE Easley M.D.    Anesthesiologist/Type of Anesthesia:  Anesthesiologist: Suad Herrera M.D./General    Surgical Staff:  Circulator: Chrissy Boswell R.N.  Relief Circulator: Merlyn Quinn R.N.  Scrub Person: Katrina Treviño; Smiley Patino  First Assist: Lisa Collins P.A.-C.    Specimens removed if any:  ID Type Source Tests Collected by Time Destination   A : LEFT TOTAL MASTECTOMY; SHORT SUPERIOR, LONG LATERAL Tissue Breast PATHOLOGY SPECIMEN Kallie Dasilva M.D. 8/15/2024 12:52 PM    B : left central inferior anterior flap; suture marks anterior Tissue Breast PATHOLOGY SPECIMEN Kallie Dasilva M.D. 8/15/2024  1:00 PM    C : left sentinel lymoh node Tissue Lymph Node PATHOLOGY SPECIMEN Bal Mora M.D. 8/15/2024  1:03 PM        Estimated Blood Loss: Minimal    Findings: See operative note    Complications: No apparent    #[id number]        8/15/2024 1:51 PM Bal Mora M.D.

## 2024-08-15 NOTE — DISCHARGE INSTRUCTIONS
Follow instructions given to you by MD   Bring drain output paperwork to your follow up     What to Expect Post Anesthesia    Rest and take it easy for the first 24 hours.  A responsible adult is recommended to remain with you during that time.  It is normal to feel sleepy.  We encourage you to not do anything that requires balance, judgment or coordination.    FOR 24 HOURS DO NOT:  Drive, operate machinery or run household appliances.  Drink beer or alcoholic beverages.  Make important decisions or sign legal documents.    To avoid nausea, slowly advance diet as tolerated, avoiding spicy or greasy foods for the first day.  Add more substantial food to your diet according to your provider's instructions.  Babies can be fed formula or breast milk as soon as they are hungry.  INCREASE FLUIDS AND FIBER TO AVOID CONSTIPATION.    MILD FLU-LIKE SYMPTOMS ARE NORMAL.  YOU MAY EXPERIENCE GENERALIZED MUSCLE ACHES, THROAT IRRITATION, HEADACHE AND/OR SOME NAUSEA.    If any questions arise, call your provider.  If your provider is not available, please feel free to call the Surgical Center at [phone].    MEDICATIONS: Resume taking daily medication.  Take prescribed pain medication with food.  If no medication is prescribed, you may take non-aspirin pain medication if needed.  PAIN MEDICATION CAN BE VERY CONSTIPATING.  Take a stool softener or laxative such as senokot, pericolace, or milk of magnesia if needed.    Tylenol and Celebrex given at 9:13 AM. You may take a dose of tylenol OR ibuprofen at 3:19 PM if needed.   Oxycodone 2:40 PM

## 2024-08-15 NOTE — OR NURSING
1414 Patient arrived to PACU from OR. Report received from RN and anesthesia. Patient attached to monitoring. VSS. Patient oxygenating well on 6 L via mask. Site assessed on chest, CDI. No signs of bleeding. Prevena and two drains in place. Breast binder on.     1441 Report given to Aundrea DORANTES

## 2024-08-15 NOTE — ANESTHESIA TIME REPORT
Anesthesia Start and Stop Event Times       Date Time Event    8/15/2024 1131 Ready for Procedure     1141 Anesthesia Start     1417 Anesthesia Stop          Responsible Staff  08/15/24      Name Role Begin End    Suad Herrera M.D. Anesth 1141 1417          Overtime Reason:  no overtime (within assigned shift)    Comments:                                                       Detail Level: Detailed

## 2024-08-16 ENCOUNTER — TELEPHONE (OUTPATIENT)
Dept: SURGERY | Facility: MEDICAL CENTER | Age: 44
End: 2024-08-16
Payer: COMMERCIAL

## 2024-08-16 NOTE — TELEPHONE ENCOUNTER
I spoke with pt and pain controlled and denies any nausea or emesis. She is voiding well. She has a follow up apptmt with Dr. Mora. She is aware to call if any questions or concerns, otherwise we will plan to see her back next week in our office for a post op apptmt.

## 2024-08-16 NOTE — OP REPORT
DATE OF SERVICE:  08/15/2024     PREOPERATIVE DIAGNOSIS:   Left breast ductal carcinoma in situ.     POSTOPERATIVE DIAGNOSIS:   Left breast ductal carcinoma in situ.     PROCEDURES:  1.  Left breast reconstruction with prepectoral placement of tissue expander   and use of an inferiorly based dermoglandular flap, 100 cm2 and use of   acellular dermal matrix on the upper pole of the breast.  2.  Use of a Prevena incision management dressing.     ATTENDING SURGEON:  Bal Mora MD     ANESTHESIOLOGIST:  Suad Herrera MD     ASSISTANT:  POPEYE Diaz.     A surgical assistant was required for retraction and closure during the case.     ESTIMATED BLOOD LOSS:  Minimal.     COMPLICATIONS:  No apparent.     SPECIMENS:  None for my portion of the operation.     INDICATIONS FOR PROCEDURE:  The patient is a 43-year-old woman who was   diagnosed with left breast ductal carcinoma in situ.  The patient has elected   to have a left mastectomy.  She now presents for the above operation.     INTRAOPERATIVE FINDINGS:  A 600 mL Allergan tissue expander was placed,   reference #[id number], serial #[id number].  Intraoperatively, there was 300 mL   placed in the expander.     PROCEDURE DETAILS:  Preoperatively, the patient was identified.  The risks,   benefits and alternatives of the operation were discussed including but was   not limited to bleeding, infection, damage to surrounding structure, need for   further surgery, reaction to anesthetic agent, scarring, breast asymmetry,   contour irregularities, wound healing difficulties, need for revisional   surgery, tissue expander failure, tissue expander rupture, need for   explantation, breast implant illness, breast implant associated anaplastic   large cell lymphoma and squamous cell carcinoma, pneumothorax, deep venous   thrombosis, pulmonary embolus, myocardial infarction, stroke, unsatisfactory   result and/or death.  Informed consent was obtained.     The  patient was identified.  In a sitting upright position, the patient's   sternal notch, midline and inframammary folds were marked.  A Gonzales pattern   mastectomy incision was drawn out.  Initially, I was going to do a horizontal   ellipse, but given the ptosis that the patient had, I felt she would be better   with a Gonzales pattern and using an inferiorly based dermoglandular flap.  This   was marked out.  Antibiotics were given.  Sequential compression devices were   placed.  The patient was brought to the operating room.  General anesthesia   was induced.  Her chest was prepped and draped in the usual sterile fashion.    Please see Dr. Kallie Dasilva's note for the mastectomy and sentinel lymph node   portion of the operation.  Following this, a liter of antibiotic irrigation   was used to copiously irrigate the entire mastectomy defect.  Then,   intercostal blocks and pecs 1 and pecs 2 blocks were placed with 0.5% Marcaine   with epinephrine for postoperative pain control.  At this point, then an   incision was made along the entire inframammary fold.  A curved Mcgarry scissors   was then used to de-epithelialize the entire inferior pole of the mastectomy   skin flap.  In doing so, I created a dermoglandular flap.  Cautery was then   used to release the tissue medially along the inframammary fold, then   laterally along the inframammary fold.  In doing so, I created a pocket for   the expander.  This was then tacked down medially and laterally to the chest   wall with horizontal mattress 2-0 Vicryl sutures.     A 10x18 cm piece of acellular dermal matrix was then soaked in antibiotic   irrigation.  This was then sewn into the upper chest wall superiorly, medially   and laterally with horizontal mattress 2-0 Vicryl sutures.  In doing so, it   also created an entire pocket along the superior pole of the breast.    Following this, then the chest wall was then measured.  A 600 mL tissue   expander was then chosen.  This  expander was then soaked in antibiotic   irrigation.  Then, using new gloves and minimal touch technique, the expander   was then placed in the pocket.  Three other tabs were secured with 2-0   Ethibond sutures.  Then, the inferiorly based dermoglandular flap was then   further mobilized and was then tacked down superiorly covering about 30% of   the acellular dermal matrix superiorly.  This was then sewn in with horizontal   mattress 2-0 Vicryl sutures.  In doing so, I created a vascular bed for the   mastectomy skin flaps.  Following this, then two 15-[place] round drains were   placed and secured with 3-0 nylon sutures.  Then, the inverted T junctions   were then brought together with 2-0 Vicryl sutures.  The dermis was then   closed with 3-0 deep dermal Monocryl sutures and running 4-0 Monocryl   subcuticular stitch was used to close the overlying skin.  At this point, then   300 mL was then placed percutaneously into the expander.  The patient was   then washed.  A 20 cm Prevena incision management dressing was then placed   along the incision along the inframammary fold.  Biopatches and Tegaderms were   placed over the drain sites.  The patient was then placed in a gently   compressive dressing.  She was then awakened, extubated and transferred to   PACU in stable condition.  At the end of the procedure, all sponge, instrument   and needle counts were correct.        ______________________________  MD CHRIS GREENFIELD/FISH    DD:  08/15/2024 13:57  DT:  08/15/2024 14:16    Job#:  [id number]

## 2024-08-16 NOTE — ANESTHESIA POSTPROCEDURE EVALUATION
Patient: Valeria Mason    Procedure Summary       Date: 08/15/24 Room / Location: [facility] ROOM 28 / SURGERY SAME DAY [facility]    Anesthesia Start: 1141 Anesthesia Stop: 1417    Procedures:       LEFT BREAST RECONSTRUCTION WITH PLACEMENT OF TISSUE EXPANDER AND USE OF ACELLULAR DERMAL MATRIX (Left: Breast)      INSERTION, TISSUE EXPANDER (Left: Breast)      LEFT TOTAL MASTECTOMY, LEFT SENTINEL LYMPH NODE INJECTION WITH EXCISION OF AXILLARY NODES (Left: Breast)      BIOPSY, LYMPH NODE, SENTINEL (Left: Axilla) Diagnosis: (LEFT BREAST DCIS)    Surgeons: Bal Mora M.D.; Kallie Dasilva M.D. Responsible Provider: Suad Herrera M.D.    Anesthesia Type: general ASA Status: 1            Final Anesthesia Type: general  Last vitals  BP   Blood Pressure: 97/55    Temp   36.2 °C (97.2 °F)    Pulse   72   Resp   20    SpO2   97 %      Anesthesia Post Evaluation    Patient location during evaluation: PACU  Level of consciousness: awake and alert  Pain score: 4    Airway patency: patent  Anesthetic complications: no  Cardiovascular status: adequate  Respiratory status: acceptable  Hydration status: acceptable    PONV: none        No notable events documented.     Nurse Pain Score: 4 (NPRS)

## 2024-08-19 NOTE — PROGRESS NOTES
"    8/21/2024  7:26 AM    Primary care provider:  Pcp Pt States None  Plastic Surgeon: Bal Mora M.D.  Imaging facility:  [facility]    CC:   Chief Complaint   Patient presents with    Post-op        HPI: This very pleasnt 43 y.o. female is scheduled for routine postoperative appointment.  She saw Dr. Mora who removed her Prevena and one of her drains. Her left drain is still approximately 50 cc q 24 hours. She also unfortunately developed a rash two days ago. She is using Benadryl and a steroid cream. She and her  Terry inform me the rash has improved significantly.     She has used the last of her Tramadol. She was informed she can take Ibuprofen along with Tylenol by [facility]. She is scheduled to see him next week. He has still placed her on restriction with arm movement.  She denies any nausea or emesis and is unsure when she had her last bowel movement, but denies any bloating or constipation. She is taking Miralax.        No Known Allergies  Current Outpatient Medications   Medication Sig    acetaminophen (TYLENOL) 120 MG Suppos Insert 120 mg into the rectum every four hours as needed.    Tirzepatide 2.5 MG/0.5ML Solution Pen-injector Inject 0.5 mL under the skin every 7 days. (Patient not taking: Reported on 8/8/2024)    aluminum chloride (DRYSOL) 20 % external solution Apply 1 Application topically every evening. (Patient not taking: Reported on 8/21/2024)       Physical Exam:  /72 (BP Location: Left arm, Patient Position: Sitting, BP Cuff Size: Large adult)   Pulse 85   Temp (!) 35.7 °C (96.2 °F) (Temporal)   Ht 1.753 m (5' 9\")   Wt 88.1 kg (194 lb 3.2 oz)   SpO2 98%   BMI 28.68 kg/m²     General: Very pleasant demeanor.  Appears well, no acute distress.  Surgical site:  Left inverted T incision healing well.  No infection or hematoma.     1. Ductal carcinoma in situ (DCIS) of left breast            ASSESSMENT:  LEFT lower inner IG and HG DCIS arising in background of ADH.  Pathologic " Stage 0 (pTis pN0 M0). Stereo biopsy 7/10/24 [facility]. LEFT total mastectomy with SLNB with ARM/prepectoral TE/ADM (Dr. Mora) 8/15/24.  36 mm. CLEAR MARGINS (4cm area grouped calcs in the lower inner breast.  HM clip placed but did not deploy.  Significant residual calcs)               Grade 3.  ER %.  NE 10%.  0 out of 5 sentinel lymph nodes     Last bilateral mammogram 24 [facility]:  Heterogen dense.  No prior mammo.     FH:  Noncontributory.  No known AJ heritage.   BRCA gene negative ([employer], [facility], 24) Mutltigenetic panel pending.     Menopausal/HRT status:  .  S/p hysterectomy with ovaries intact.    Age of first delivery: 25   Menarche: 15   LMP: 2573-2205, no HRT      LIFESTYLE:  No smoking or vaping history.  No alcohol or drug history.  BMI 28.       ECOG 0= Fully active, able to carry on all pre-disease performance without restriction.     DISCUSSED/PLAN:  Dr. Dasilva has reviewed pathology results. We have discussed a referral to medical oncology for discussion of endocrine therapy in those who are highly motivated.     I  have reviewed post operative instructions with patient including but not limited to monitoring symptoms such as erythema, purulent discharge and fevers. She is aware to notify  Dr. Mora's office if these symptoms develop.    She is aware to continue to follow post operative range of motion per Dr. Mora's instructions. We discussed stretching for ROM when cleared by Dr. Mora but to continue to limit arm use. We discussed going to the lymphedema prevention and education class, and she has been given a hand out.    We have discussed the importance of walking daily.  We have discussed nutrition and exercise.     Following our discussion regarding the role of a wellness oncology physician, she does not expressed interest in a referral. She is aware if she would like a referral in the future, it is available.    We will plan to see her back in four weeks for a post  operative visit.     REFERRALS:    Medical Oncology

## 2024-08-21 ENCOUNTER — OFFICE VISIT (OUTPATIENT)
Dept: SURGERY | Facility: MEDICAL CENTER | Age: 44
End: 2024-08-21
Payer: COMMERCIAL

## 2024-08-21 VITALS
OXYGEN SATURATION: 98 % | HEIGHT: 69 IN | BODY MASS INDEX: 28.76 KG/M2 | DIASTOLIC BLOOD PRESSURE: 72 MMHG | TEMPERATURE: 96.2 F | SYSTOLIC BLOOD PRESSURE: 116 MMHG | WEIGHT: 194.2 LBS | HEART RATE: 85 BPM

## 2024-08-21 DIAGNOSIS — D05.12 DUCTAL CARCINOMA IN SITU (DCIS) OF LEFT BREAST: ICD-10-CM

## 2024-08-21 PROCEDURE — 3078F DIAST BP <80 MM HG: CPT | Performed by: SPECIALIST

## 2024-08-21 PROCEDURE — 99024 POSTOP FOLLOW-UP VISIT: CPT | Performed by: SPECIALIST

## 2024-08-21 PROCEDURE — 3074F SYST BP LT 130 MM HG: CPT | Performed by: SPECIALIST

## 2024-08-21 RX ORDER — ACETAMINOPHEN 120 MG/1
120 SUPPOSITORY RECTAL EVERY 4 HOURS PRN
COMMUNITY

## 2024-09-17 NOTE — PROGRESS NOTES
"    2024  8:08 AM    Primary care provider:  Pcp Pt States None  Medical oncologist:  Dwain Devries M.D.  Plastic surgeon:  Bal Mora M.D.  Imaging facility:  [facility]    CC:   Chief Complaint   Patient presents with    Post-op        HPI: This very pleasant 43 y.o. female is scheduled for routine postoperative appointment.      She is scheduled to see Dr. Devries on 24. She continues to recover well. She had her last fill on 24 and her next fill is on 24. She has returned to work. She was unable to go to the lymphedema prevention and education class. She will be cleared for full movement after next week. She has occasional pain in the breast area.     Physical Exam:  /65 (BP Location: Left arm, Patient Position: Sitting, BP Cuff Size: Large adult)   Pulse 90   Temp 36.1 °C (96.9 °F) (Temporal)   Ht 1.753 m (5' 9\")   Wt 88.9 kg (196 lb)   SpO2 96%   BMI 28.94 kg/m²     General: Very pleasant demeanor.  Appears well, no acute distress.  Surgical site:  Left incision well healed.  No infection or hematoma.     1. Intraductal carcinoma in situ of left breast            ASSESSMENT:  LEFT lower inner IG and HG DCIS arising in background of ADH.  Pathologic Stage 0 (pTis pN0 M0). Stereo biopsy 7/10/24 [facility]. LEFT total mastectomy with SLNB with ARM/prepectoral TE/ADM (Dr. Mora) 8/15/24.  36 mm. CLEAR MARGINS (4cm area grouped calcs in the lower inner breast.  HM clip placed but did not deploy.  Significant residual calcs)               Grade 3.  ER %.  MT 10%.  0 out of 5 sentinel lymph nodes     Last bilateral mammogram 24 [facility]:  Heterogen dense.  No prior mammo.     FH:  Noncontributory.  No known AJ heritage.   [facility] gene [facility] ([employer]) Mutltigenetic panel negative     Menopausal/HRT status:  .  S/p hysterectomy with ovaries intact.    Age of first delivery: 25   Menarche: 15   LMP: 4514-9216, no HRT      LIFESTYLE:  No smoking or vaping " history.  No alcohol or drug history.  BMI 28.       ECOG 0= Fully active, able to carry on all pre-disease performance without restriction.     DISCUSSED/PLAN:  She is aware to continue to follow post operative range of motion. We discussed stretching for ROM when cleared by Dr. Mora. We will also refer to lymphedema physical therapy.     We have discussed the importance of walking daily.  We have discussed nutrition and exercise.     We have discussed the importance of self breast examination and monitor for changes in breast pain, nipple discharge, skin changes, masses and countour/nipple changes. If these are to occur she is to notify our office.     She is aware to call our office in four months for a six month follow up visit.     REFERRALS:    Lymphedema physical therapy      Naomi XIAO was also present at this appointment.

## 2024-09-18 ENCOUNTER — OFFICE VISIT (OUTPATIENT)
Dept: SURGERY | Facility: MEDICAL CENTER | Age: 44
End: 2024-09-18
Payer: COMMERCIAL

## 2024-09-18 VITALS
HEIGHT: 69 IN | DIASTOLIC BLOOD PRESSURE: 65 MMHG | HEART RATE: 90 BPM | WEIGHT: 196 LBS | OXYGEN SATURATION: 96 % | SYSTOLIC BLOOD PRESSURE: 104 MMHG | BODY MASS INDEX: 29.03 KG/M2 | TEMPERATURE: 96.9 F

## 2024-09-18 DIAGNOSIS — D05.12 INTRADUCTAL CARCINOMA IN SITU OF LEFT BREAST: ICD-10-CM

## 2024-09-18 PROCEDURE — 3074F SYST BP LT 130 MM HG: CPT | Performed by: SPECIALIST

## 2024-09-18 PROCEDURE — 3078F DIAST BP <80 MM HG: CPT | Performed by: SPECIALIST

## 2024-09-18 PROCEDURE — 99024 POSTOP FOLLOW-UP VISIT: CPT | Performed by: SPECIALIST

## 2024-09-23 ENCOUNTER — TELEPHONE (OUTPATIENT)
Dept: SURGERY | Facility: MEDICAL CENTER | Age: 44
End: 2024-09-23
Payer: COMMERCIAL

## 2024-09-23 NOTE — TELEPHONE ENCOUNTER
I spoke with Kelsi Recio at Dr. Devries's  who will contact the patient to get them sooner than November

## 2024-09-26 ENCOUNTER — APPOINTMENT (OUTPATIENT)
Dept: HEMATOLOGY ONCOLOGY | Facility: MEDICAL CENTER | Age: 44
End: 2024-09-26
Payer: COMMERCIAL

## 2024-10-01 ENCOUNTER — PHYSICAL THERAPY (OUTPATIENT)
Dept: PHYSICAL THERAPY | Facility: REHABILITATION | Age: 44
End: 2024-10-01
Attending: SPECIALIST
Payer: COMMERCIAL

## 2024-10-01 ENCOUNTER — APPOINTMENT (OUTPATIENT)
Dept: HEMATOLOGY ONCOLOGY | Facility: MEDICAL CENTER | Age: 44
End: 2024-10-01
Payer: COMMERCIAL

## 2024-10-01 DIAGNOSIS — R29.898 WEAKNESS OF LEFT UPPER EXTREMITY: ICD-10-CM

## 2024-10-01 DIAGNOSIS — D05.12 INTRADUCTAL CARCINOMA IN SITU OF LEFT BREAST: ICD-10-CM

## 2024-10-01 DIAGNOSIS — M25.612 DECREASED RANGE OF MOTION OF LEFT SHOULDER: ICD-10-CM

## 2024-10-01 DIAGNOSIS — I89.0 LYMPHEDEMA: ICD-10-CM

## 2024-10-01 DIAGNOSIS — R68.89 DECREASED ACTIVITY TOLERANCE: ICD-10-CM

## 2024-10-01 PROCEDURE — 97163 PT EVAL HIGH COMPLEX 45 MIN: CPT

## 2024-10-03 ENCOUNTER — PATIENT OUTREACH (OUTPATIENT)
Dept: ONCOLOGY | Facility: MEDICAL CENTER | Age: 44
End: 2024-10-03
Payer: COMMERCIAL

## 2024-10-08 ENCOUNTER — PHYSICAL THERAPY (OUTPATIENT)
Dept: PHYSICAL THERAPY | Facility: REHABILITATION | Age: 44
End: 2024-10-08
Attending: SPECIALIST
Payer: COMMERCIAL

## 2024-10-08 ENCOUNTER — HOSPITAL ENCOUNTER (OUTPATIENT)
Dept: HEMATOLOGY ONCOLOGY | Facility: MEDICAL CENTER | Age: 44
End: 2024-10-08
Attending: INTERNAL MEDICINE
Payer: COMMERCIAL

## 2024-10-08 VITALS
TEMPERATURE: 97.2 F | DIASTOLIC BLOOD PRESSURE: 76 MMHG | WEIGHT: 191.03 LBS | BODY MASS INDEX: 28.29 KG/M2 | HEIGHT: 69 IN | HEART RATE: 88 BPM | OXYGEN SATURATION: 98 % | RESPIRATION RATE: 17 BRPM | SYSTOLIC BLOOD PRESSURE: 102 MMHG

## 2024-10-08 DIAGNOSIS — R29.898 WEAKNESS OF LEFT UPPER EXTREMITY: ICD-10-CM

## 2024-10-08 DIAGNOSIS — M25.612 DECREASED RANGE OF MOTION OF LEFT SHOULDER: ICD-10-CM

## 2024-10-08 DIAGNOSIS — I89.0 LYMPHEDEMA: ICD-10-CM

## 2024-10-08 DIAGNOSIS — R68.89 DECREASED ACTIVITY TOLERANCE: ICD-10-CM

## 2024-10-08 DIAGNOSIS — D05.12 INTRADUCTAL CARCINOMA IN SITU OF LEFT BREAST: ICD-10-CM

## 2024-10-08 DIAGNOSIS — D05.12 DUCTAL CARCINOMA IN SITU (DCIS) OF LEFT BREAST: ICD-10-CM

## 2024-10-08 PROCEDURE — 97140 MANUAL THERAPY 1/> REGIONS: CPT

## 2024-10-08 PROCEDURE — 99204 OFFICE O/P NEW MOD 45 MIN: CPT | Performed by: INTERNAL MEDICINE

## 2024-10-08 PROCEDURE — 99212 OFFICE O/P EST SF 10 MIN: CPT | Performed by: INTERNAL MEDICINE

## 2024-10-08 PROCEDURE — 97110 THERAPEUTIC EXERCISES: CPT

## 2024-10-08 RX ORDER — OMEGA-3 FATTY ACIDS/FISH OIL 300-1000MG
CAPSULE ORAL
COMMUNITY

## 2024-10-08 RX ORDER — GABAPENTIN 300 MG/1
300 CAPSULE ORAL 3 TIMES DAILY
COMMUNITY

## 2024-10-08 RX ORDER — TAMOXIFEN CITRATE 20 MG/1
20 TABLET ORAL DAILY
Qty: 60 TABLET | Refills: 3 | Status: SHIPPED | OUTPATIENT
Start: 2024-10-08

## 2024-10-08 ASSESSMENT — PAIN SCALES - GENERAL: PAINLEVEL: 4=SLIGHT-MODERATE PAIN

## 2024-10-10 ENCOUNTER — HOSPITAL ENCOUNTER (OUTPATIENT)
Dept: LAB | Facility: MEDICAL CENTER | Age: 44
End: 2024-10-10
Attending: INTERNAL MEDICINE
Payer: COMMERCIAL

## 2024-10-10 DIAGNOSIS — D05.12 DUCTAL CARCINOMA IN SITU (DCIS) OF LEFT BREAST: ICD-10-CM

## 2024-10-10 LAB
ESTRADIOL SERPL-MCNC: 245 PG/ML
FSH SERPL-ACNC: 5.5 MIU/ML

## 2024-10-10 PROCEDURE — 36415 COLL VENOUS BLD VENIPUNCTURE: CPT

## 2024-10-10 PROCEDURE — 83001 ASSAY OF GONADOTROPIN (FSH): CPT

## 2024-10-10 PROCEDURE — 82670 ASSAY OF TOTAL ESTRADIOL: CPT

## 2024-10-15 ENCOUNTER — PHYSICAL THERAPY (OUTPATIENT)
Dept: PHYSICAL THERAPY | Facility: REHABILITATION | Age: 44
End: 2024-10-15
Attending: SPECIALIST
Payer: COMMERCIAL

## 2024-10-15 DIAGNOSIS — I89.0 LYMPHEDEMA: ICD-10-CM

## 2024-10-15 DIAGNOSIS — M25.612 DECREASED RANGE OF MOTION OF LEFT SHOULDER: ICD-10-CM

## 2024-10-15 DIAGNOSIS — R29.898 WEAKNESS OF LEFT UPPER EXTREMITY: ICD-10-CM

## 2024-10-15 DIAGNOSIS — R68.89 DECREASED ACTIVITY TOLERANCE: ICD-10-CM

## 2024-10-15 DIAGNOSIS — D05.12 INTRADUCTAL CARCINOMA IN SITU OF LEFT BREAST: ICD-10-CM

## 2024-10-15 PROCEDURE — 97110 THERAPEUTIC EXERCISES: CPT

## 2024-10-15 PROCEDURE — 97140 MANUAL THERAPY 1/> REGIONS: CPT

## 2024-10-22 ENCOUNTER — APPOINTMENT (OUTPATIENT)
Dept: PHYSICAL THERAPY | Facility: REHABILITATION | Age: 44
End: 2024-10-22
Attending: SPECIALIST
Payer: COMMERCIAL

## 2024-10-28 ENCOUNTER — PHYSICAL THERAPY (OUTPATIENT)
Dept: PHYSICAL THERAPY | Facility: REHABILITATION | Age: 44
End: 2024-10-28
Attending: SPECIALIST
Payer: COMMERCIAL

## 2024-10-28 DIAGNOSIS — I89.0 LYMPHEDEMA: ICD-10-CM

## 2024-10-28 DIAGNOSIS — R68.89 DECREASED ACTIVITY TOLERANCE: ICD-10-CM

## 2024-10-28 DIAGNOSIS — R29.898 WEAKNESS OF LEFT UPPER EXTREMITY: ICD-10-CM

## 2024-10-28 DIAGNOSIS — D05.12 INTRADUCTAL CARCINOMA IN SITU OF LEFT BREAST: ICD-10-CM

## 2024-10-28 DIAGNOSIS — M25.612 DECREASED RANGE OF MOTION OF LEFT SHOULDER: ICD-10-CM

## 2024-10-28 PROCEDURE — 97110 THERAPEUTIC EXERCISES: CPT

## 2024-10-28 PROCEDURE — 97140 MANUAL THERAPY 1/> REGIONS: CPT

## 2024-11-12 ENCOUNTER — APPOINTMENT (OUTPATIENT)
Dept: PHYSICAL THERAPY | Facility: REHABILITATION | Age: 44
End: 2024-11-12
Attending: SPECIALIST
Payer: COMMERCIAL

## 2024-11-18 ENCOUNTER — PHYSICAL THERAPY (OUTPATIENT)
Dept: PHYSICAL THERAPY | Facility: REHABILITATION | Age: 44
End: 2024-11-18
Attending: SPECIALIST
Payer: COMMERCIAL

## 2024-11-18 DIAGNOSIS — M25.612 DECREASED RANGE OF MOTION OF LEFT SHOULDER: ICD-10-CM

## 2024-11-18 DIAGNOSIS — I89.0 LYMPHEDEMA: ICD-10-CM

## 2024-11-18 DIAGNOSIS — D05.12 INTRADUCTAL CARCINOMA IN SITU OF LEFT BREAST: ICD-10-CM

## 2024-11-18 DIAGNOSIS — R68.89 DECREASED ACTIVITY TOLERANCE: ICD-10-CM

## 2024-11-18 DIAGNOSIS — R29.898 WEAKNESS OF LEFT UPPER EXTREMITY: ICD-10-CM

## 2024-11-18 PROCEDURE — 97110 THERAPEUTIC EXERCISES: CPT

## 2024-11-18 PROCEDURE — 97140 MANUAL THERAPY 1/> REGIONS: CPT

## 2024-11-18 NOTE — OP THERAPY DAILY TREATMENT
Outpatient Physical Therapy  LYMPHEDEMA THERAPY DAILY TREATMENT     [facility] Physical [address]  [address]  [place] [address]  Phone:  [phone]  Fax:  [phone]    Date: 11/18/2024    Patient: Valeria Mason  YOB: 1980  MRN: [id number]     Time Calculation    Start time: 1032  Stop time: 1116 Time Calculation (min): 44 minutes         Chief Complaint: Shoulder Problem and Breast Pain    Visit #: 5    Subjective:   History of Present Illness:     Mechanism of injury:  Expander has been bothersome lately. Otherwise, her activity has been going well; some tightness with horizontal abduction.       Lymphedema Objective        Therapeutic Exercises (CPT 62803):     1. Foam Roll Sequence without roller, HEP    2. BKFO, HEP    3. wall saul, HEP    4. supine saul    5. wall walk with lift off, HEP    6. sidely shoulder abduction with pink band, HEP    7. doorway saul with pink band, HEP    8. backward spider, HEP    9. shoulder rows with pink band, HEP    10. shoulder ER with pink band, HEP    11. shoulder Y and T with orange band, HEP    12. corner door massage ball, x10 x2 locations, option to add to HEP      Therapeutic Exercise Summary: HEP: wall walk with hold, tissue massage to L breast for desensitization    Therapeutic Treatments and Modalities:     1. Manual Therapy (CPT 59382)    Therapeutic Treatment and Modalities Summary: -trigger point release to L pec major and pec minor, subscapularis, teres major/minor, supraspinatus.   -scapular mobilization in sidely; all planes  -AP to ribs 2-5  -ART to pec major, subscap, and lats  -seated mobilization iwht movement to thoracic spine  -contract/relax into rib rotation L    Time-based treatments/modalities:    Physical Therapy Timed Treatment Charges  Manual therapy minutes (CPT 93819): 34 minutes  Therapeutic exercise minutes (CPT 21851): 10 minutes      Assessment and Plan:   Assessment details:  Valeria young  progressing excellently with her home program where she is becoming more functional with minor discomfort from expander during stretch. She is preparing for surgery in mid-December and will benefit from intervention to assist with her becoming as strong as possible pre-op for a smoother recovery.     Plan:  Therapy options:  Physical therapy treatment to continue  Discussed with:  Patient

## 2024-11-18 NOTE — OP THERAPY PROGRESS SUMMARY
Outpatient Physical Therapy  PROGRESS SUMMARY NOTE      [facility] Physical Therapy [facility]  [address]  [address]  [place] [address]  Phone:  [phone]  Fax:  [phone]    Date of Visit: 11/18/2024    Patient: Valeria Mason  YOB: 1980  MRN: [id number]     Referring Provider: Lisa Collins P.A.-C.  [address]  [address]  [place] [address]   Referring Diagnosis Intraductal carcinoma in situ of left breast [D05.12]     Visit Diagnoses     ICD-10-CM   1. Intraductal carcinoma in situ of left breast  D05.12   2. Decreased range of motion of left shoulder  M25.612   3. Lymphedema  I89.0   4. Weakness of left upper extremity  R29.898   5. Decreased activity tolerance  R68.89       Rehab Potential: good    Progress Report Period: 10/1/24-11/18/24          Objective Findings and Assessment:   Patient progression towards goals: Valeria is progressing excellently with her home program where she is becoming more functional with minor discomfort from expander during stretch. She is preparing for surgery in mid-December and will benefit from intervention to assist with her becoming as strong as possible pre-op for a smoother recovery.       Objective findings and assessment details:   Other Notes 11/18  L shoulder flexion: 180deg  L shoulder abduction 180deg    Other Notes EVAL  L shoulder flexion: 120deg  L shoulder abduction 140deg     R shoulder without gross limitation      Goals:   Short Term Goals:   1. Pt will improve GHJ abduction ROM by 10degrees to improve ability to reach superolaterally. Goal Met  2. Pt will improve GHJ flexion ROM by 10degrees to improve ability to reach overhead. Goal Met  3. Pt will name three risk factors for lymphedema to reduce her risk of occurrence. Goal Met    Short term goal time span:  2-4 weeks      Long Term Goals:     1. Pt will improve her GHJ ROM to full to enable her to reach overhead without issue.Goal Met  2. Patient will initiate a gentle  strengthening program to her B UE to improve lean muscle mass and lymphatic flow.  Goal Met  3. Pt will perform desensitization techniques to her L breast and chest to improve ability to sleep on that side or stomach 3/7 days a week. Partially Met; having trouble sleeping on firmer surfaces    Long term goal time span:  4-6 weeks    Plan:   Planned therapy interventions:  Functional Training, Self Care (CPT 61625), Manual Therapy (CPT 25135), Therapeutic Activities (CPT 86895) and Therapeutic Exercise (CPT 56715)  Frequency:  1x week  Duration in weeks:  8      Referring provider co-signature:  I have reviewed this plan of care and my co-signature certifies the need for services.     Certification Period: 11/18/2024 to 01/13/25    Physician Signature: ________________________________ Date: ______________

## 2024-11-26 ENCOUNTER — PHYSICAL THERAPY (OUTPATIENT)
Dept: PHYSICAL THERAPY | Facility: REHABILITATION | Age: 44
End: 2024-11-26
Attending: SPECIALIST
Payer: COMMERCIAL

## 2024-11-26 DIAGNOSIS — D05.12 INTRADUCTAL CARCINOMA IN SITU OF LEFT BREAST: ICD-10-CM

## 2024-11-26 DIAGNOSIS — R29.898 WEAKNESS OF LEFT UPPER EXTREMITY: ICD-10-CM

## 2024-11-26 DIAGNOSIS — I89.0 LYMPHEDEMA: ICD-10-CM

## 2024-11-26 DIAGNOSIS — D05.12 DUCTAL CARCINOMA IN SITU (DCIS) OF LEFT BREAST: ICD-10-CM

## 2024-11-26 DIAGNOSIS — R68.89 DECREASED ACTIVITY TOLERANCE: ICD-10-CM

## 2024-11-26 DIAGNOSIS — M25.612 DECREASED RANGE OF MOTION OF LEFT SHOULDER: ICD-10-CM

## 2024-11-26 PROCEDURE — 97140 MANUAL THERAPY 1/> REGIONS: CPT

## 2024-11-26 NOTE — OP THERAPY DAILY TREATMENT
Outpatient Physical Therapy  LYMPHEDEMA THERAPY DAILY TREATMENT     [facility] Physical [address].  [address]  [place] [address]  Phone:  [phone]  Fax:  [phone]    Date: 11/26/2024    Patient: Valeria Mason  YOB: 1980  MRN: [id number]     Time Calculation    Start time: 0816  Stop time: 0900 Time Calculation (min): 44 minutes         Chief Complaint: Shoulder Problem    Visit #: 6    Subjective:   History of Present Illness:     Mechanism of injury:  Expander bothersome; excited for reconstruction.       Lymphedema Objective        Therapeutic Exercises (CPT 22674):     1. Foam Roll Sequence without roller, HEP    2. BKFO, HEP    3. wall saul, HEP    4. supine saul    5. wall walk with lift off, HEP    6. sidely shoulder abduction with pink band, HEP    7. doorway saul with pink band, HEP    8. backward spider, HEP    9. shoulder rows with pink band, HEP    10. shoulder ER with pink band, HEP    11. shoulder Y and T with orange band, HEP    12. corner door massage ball, HEP      Therapeutic Exercise Summary: HEP: wall walk with hold, tissue massage to L breast for desensitization    Therapeutic Treatments and Modalities:     1. Manual Therapy (CPT 13395)    Therapeutic Treatment and Modalities Summary: -Trigger point release to L: forearm extensors, biceps, pec major, pec minor, subscap, supraspinatus, infraspinatus, teres major/minor, traps, levator scap   -L joint mobilization to radius/ulna for interossei, wrist, CMC  -MLD to L breast including parasternal and intercostal collectors. Posteriorly as well.   -MLD to L UE   -ART to L pectorals and subscapularis    Time-based treatments/modalities:    Physical Therapy Timed Treatment Charges  Manual therapy minutes (CPT 94140): 44 minutes      Assessment and Plan:   Assessment details:  Valeria has been able to perform her walking as well as light resistance at the gym. Her overhead ROM is full with some pull  at her L expander. She will benefit from further intervention to assist with her comfort with reaching overhead in preparation for her upcoming reconstruction.     Plan:  Therapy options:  Physical therapy treatment to continue  Discussed with:  Patient

## 2024-11-27 ENCOUNTER — TELEPHONE (OUTPATIENT)
Dept: SURGERY | Facility: MEDICAL CENTER | Age: 44
End: 2024-11-27
Payer: COMMERCIAL

## 2024-12-02 ENCOUNTER — OFFICE VISIT (OUTPATIENT)
Dept: SURGERY | Facility: MEDICAL CENTER | Age: 44
End: 2024-12-02
Payer: COMMERCIAL

## 2024-12-02 VITALS
DIASTOLIC BLOOD PRESSURE: 84 MMHG | SYSTOLIC BLOOD PRESSURE: 124 MMHG | HEART RATE: 87 BPM | WEIGHT: 188.2 LBS | HEIGHT: 69 IN | TEMPERATURE: 97.9 F | BODY MASS INDEX: 27.88 KG/M2 | OXYGEN SATURATION: 98 %

## 2024-12-02 DIAGNOSIS — D05.12 DUCTAL CARCINOMA IN SITU (DCIS) OF LEFT BREAST: ICD-10-CM

## 2024-12-02 PROCEDURE — 3079F DIAST BP 80-89 MM HG: CPT | Performed by: OBSTETRICS & GYNECOLOGY

## 2024-12-02 PROCEDURE — 99215 OFFICE O/P EST HI 40 MIN: CPT | Performed by: OBSTETRICS & GYNECOLOGY

## 2024-12-02 PROCEDURE — G2212 PROLONG OUTPT/OFFICE VIS: HCPCS | Performed by: OBSTETRICS & GYNECOLOGY

## 2024-12-02 PROCEDURE — 3074F SYST BP LT 130 MM HG: CPT | Performed by: OBSTETRICS & GYNECOLOGY

## 2024-12-02 NOTE — PROGRESS NOTES
Primary Care Provider Pcp Pt States None   Referring Provider: Kallie Dasilva MD   Surgical Oncologist: Kallie Dasilva MD   Medical Oncologist: Dwain Devries MD    Physical Therapy: Julianne Alfonso    HPI:  44 y.o. yo    Patient referred for wellness / lifestyle medicine care after diagnosis of L breast DCIS and ADH 2024   L total mastectomy / SLNB 2024 with Dr Dasilva  H/o hysterectomy in past  Genetics panel -   Started on Tamoxifen - Dr Devries   FSH 5.5 / E2 245 10/2024  - wanting to discuss hormones     Comorbidities: on Tirzepatide for weight loss    This very delightful patient presents today actually referred by Julianne Alfonso to discuss some strategies going forward.  She states overall she is actually doing really well she states I am totally fine but just nervous about potential side effects of tamoxifen in the future.  She states she started it 6 weeks ago she is actually tolerating it really well but just wants to have a game plan if she were to experience issues with sexual health and intimacy or with weight gain.  These are the 2 things that particularly concerned her when she reads about side effects and wants to establish care and to go for help if she were to have these things.    From a sexual health standpoint she is without any problems or issues currently.    From a weight perspective she gained about 40 pounds after her hysterectomy and then really struggled to lose this weight despite being very active and a healthy diet.  She has been on tirzepatide and has lost approximately 20 pounds and has about 20 pounds left to reach her goal.    Regarding nutrition patient states overall she knows what to do, likes fruits and vegetables eats processed foods, does most of her cooking at home, she does struggle to get enough protein because eats a more carbohydrate heavy diet.    Regarding exercise she has started walking again.  Prior to the diagnosis she would do walking on  a 15% incline on the treadmill and as well as HIT workouts.  She plans to return to the use after her reconstruction surgery in a couple months.               Patient Active Problem List   Diagnosis    Encounter to establish care with new doctor    Class 1 obesity due to excess calories without serious comorbidity with body mass index (BMI) of 30.0 to 30.9 in adult    Excessive sweating    Motion sickness    Carcinoma in situ of left breast        Past Surgical History:   Procedure Laterality Date    BREAST RECONSTRUCTION Left 8/15/2024    Procedure: LEFT BREAST RECONSTRUCTION WITH PLACEMENT OF TISSUE EXPANDER AND USE OF ACELLULAR DERMAL MATRIX;  Surgeon: Bal Mora M.D.;  Location: SURGERY SAME DAY [facility];  Service: Plastics    TISSUE EXPANDER PLACE/REMOVE Left 8/15/2024    Procedure: INSERTION, TISSUE EXPANDER;  Surgeon: Bal Mora M.D.;  Location: SURGERY SAME DAY [facility];  Service: Plastics    MASTECTOMY Left 8/15/2024    Procedure: LEFT TOTAL MASTECTOMY, LEFT SENTINEL LYMPH NODE INJECTION WITH EXCISION OF AXILLARY NODES;  Surgeon: Kallie Dasilva M.D.;  Location: SURGERY SAME DAY [facility];  Service: Plastics    NODE BIOPSY SENTINEL Left 8/15/2024    Procedure: BIOPSY, LYMPH NODE, SENTINEL;  Surgeon: Kallie Dasilva M.D.;  Location: SURGERY SAME DAY [facility];  Service: Plastics    ABDOMINAL HYSTERECTOMY TOTAL          Social History     Tobacco Use    Smoking status: Never     Passive exposure: Never    Smokeless tobacco: Never   Vaping Use    Vaping status: Never Used   Substance Use Topics    Alcohol use: No    Drug use: No        Family History   Problem Relation Age of Onset    Diabetes Father     Heart Disease Father         Valeria Mason is allergic to chlorhexidine.     Valeria Mason has a current medication list which includes the following prescription(s): tirzepatide, gabapentin, ibuprofen, and tamoxifen.     Physical Exam  Vitals reviewed.   Constitutional:       Appearance:  Normal appearance.   Skin:     General: Skin is warm.   Neurological:      General: No focal deficit present.      Mental Status: She is alert and oriented to person, place, and time.   Psychiatric:         Mood and Affect: Mood normal.         Behavior: Behavior normal.         Thought Content: Thought content normal.          There are no diagnoses linked to this encounter.     We reviewed the goal of the oncology wellness clinic is to assist her in optimizing health, sense of well-being, reducing recurrence risk, and improving quality of life after a cancer diagnosis. We identified the following areas which are currently identified as priorities to work on by this patient:     achieving a healthier weight  and sexual health and intimacy    Our current plan includes:   We discussed a wide variety of lifestyle factors and the importance they play in cancer survivorship, not only to optimize short and long term health, but to reduce the risk of recurrence, and improve quality of life in all stages of the disease. , I reviewed the lifestyle data specific to breast cancer recurrence risk and adherence to lifestyle recommendations from: Aakash RA, Quan KM, Fuad EW, et al. Adherence to Cancer Prevention Lifestyle Recommendations Before, During, and 2 Years After Treatment for High-risk Breast Cancer. BOB Netw Open. 2023;6(5):d4463263. doi:10.1001/jamanetworkopen.2023.45397 and the specifics of these recommendations were reviewed. , Patient was given my packet of lifestyle resources, including nutrition and physical activity information , The benefits of a more plant-forward diet, focused on a wide variety of fruits, vegetables, legumes, nuts, seeds, and lean proteins was explained. , The patient and I discussed a goal of incorporating more fiber as well as diversity, slowly increasing to 25-30 grams a day. There is also benefit to a wide diversity of fiber sources. , The patient will work toward 5-8 servings of  fruits/ vegetables per day, which helps also meet fiber goals. , The patient will try to minimize added sugar to less than 25 grams / day. , Discussed the ultimate goal being 150 - 300 min a week of moderate intensity movement AND 2 strength training workouts / week. But it is important for patients to start where they are and slowly increase to these goals , and Local exercise resources were shared with the patient - [employer], [employer]    Currently because the patient is doing so well I left her just with the above strategies and this information was something that she felt that she could be very successful in implementing in her daily life.  I do not feel that she needs ongoing care with me unless she struggles with any of the above in the future particularly the sexual health side effects or weight gain or joint pain that she is fearful of.  I explained that she could see me if she was starting to experience the side effects and we would work through it with the goal of keeping her on the tamoxifen but just managing the side effects as effortlessly as possible.    Sexual Health Plan:   Discussed vaginal moisturizers. their regular use, and variety of brands. Samples given. and Discussed the importance of liberal use of lubricants with sexual activity and acts of intimacy, reviewed water, oil, and silicone based options. Samples given.     As mentioned previously the patient is not having any current symptoms I did give her samples and she will return to see me if she has any issues in the future.    Total time spent today, including personal review of past history, face to face time, chart documentation, and  was 75 minutes.

## 2024-12-03 ENCOUNTER — HOSPITAL ENCOUNTER (OUTPATIENT)
Dept: HEMATOLOGY ONCOLOGY | Facility: MEDICAL CENTER | Age: 44
End: 2024-12-03
Attending: INTERNAL MEDICINE
Payer: COMMERCIAL

## 2024-12-03 VITALS
HEART RATE: 82 BPM | WEIGHT: 186.84 LBS | OXYGEN SATURATION: 99 % | BODY MASS INDEX: 27.67 KG/M2 | DIASTOLIC BLOOD PRESSURE: 72 MMHG | HEIGHT: 69 IN | TEMPERATURE: 97.5 F | SYSTOLIC BLOOD PRESSURE: 102 MMHG

## 2024-12-03 DIAGNOSIS — D05.12 DUCTAL CARCINOMA IN SITU (DCIS) OF LEFT BREAST: ICD-10-CM

## 2024-12-03 PROCEDURE — 99212 OFFICE O/P EST SF 10 MIN: CPT | Performed by: INTERNAL MEDICINE

## 2024-12-03 PROCEDURE — 99213 OFFICE O/P EST LOW 20 MIN: CPT | Performed by: INTERNAL MEDICINE

## 2024-12-03 RX ORDER — TAMOXIFEN CITRATE 20 MG/1
20 TABLET ORAL DAILY
Qty: 60 TABLET | Refills: 3 | Status: SHIPPED
Start: 2024-12-03 | End: 2024-12-03

## 2024-12-03 RX ORDER — TAMOXIFEN CITRATE 20 MG/1
20 TABLET ORAL DAILY
Qty: 90 TABLET | Refills: 3 | Status: SHIPPED | OUTPATIENT
Start: 2024-12-03

## 2024-12-03 ASSESSMENT — PAIN SCALES - GENERAL: PAINLEVEL_OUTOF10: NO PAIN

## 2024-12-03 NOTE — ADDENDUM NOTE
Encounter addended by: Salty Woodard, Med Ass't on: 12/3/2024 9:29 AM   Actions taken: Charge Capture section accepted

## 2024-12-03 NOTE — ADDENDUM NOTE
Encounter addended by: Dwain Devries M.D. on: 12/3/2024 10:01 AM   Actions taken: Order list changed, Diagnosis association updated

## 2024-12-03 NOTE — PROGRESS NOTES
Consult:  Hematology/Oncology      Referring Physician: Kallie Dasilva MD  Primary Care:  Pcp Pt States None    Diagnosis: DCIS of left breast    Chief Complaint: DCIS of the left breast    History of Presenting Illness:  Valeria Mason is a 44 y.o. probable premenopausal white female who had a baseline mammogram on 6/7/2024 which showed calcifications in the entire left lower inner quadrant BI-RADS 0.  She had a diagnostic mammogram with tomosynthesis that was BI-RADS 4 and underwent a stereotactic biopsy of the lower inner quadrant calcifications on 7/10/2024.  Pathology showed intermediate grade DCIS arising in a background of atypical ductal hyperplasia.  ER was +91 to 100%, [place] +10%.  She saw Dr. Dasilva and underwent a left total mastectomy and sentinel lymph node biopsy on 8/15/2024.  Pathology showed intermediate high-grade DCIS in the lower inner quadrant with an estimated area of involvement of 36 mm.  All margins were clear.  5 sentinel lymph nodes were negative.  She had immediate tissue expander reconstruction and is undergoing fills now.  Postoperative course was unremarkable.  She has a history of a abdominal hysterectomy without oophorectomy in the past.  She does not have hot flashes or night sweats.  13 gene hereditary breast cancer panel was negative.  With broader panel pending.    Interval history 12/3/2024: She started tamoxifen in October 2024 and is tolerating it extremely well.  She has no hot flashes or other symptomatology associated with it.  Her estradiol and FSH levels were consistent with premenopausal status.  She is getting her permanent left implant later this week with Dr. Mora.  What you should      Past Medical History:   Diagnosis Date    Cancer (HCC) 7/2024    PONV (postoperative nausea and vomiting) 2018    Nausea after my hysteretomy       Past Surgical History:   Procedure Laterality Date    BREAST RECONSTRUCTION Left 8/15/2024    Procedure: LEFT BREAST RECONSTRUCTION WITH  PLACEMENT OF TISSUE EXPANDER AND USE OF ACELLULAR DERMAL MATRIX;  Surgeon: Bal Mora M.D.;  Location: SURGERY SAME DAY [facility];  Service: Plastics    TISSUE EXPANDER PLACE/REMOVE Left 8/15/2024    Procedure: INSERTION, TISSUE EXPANDER;  Surgeon: Bal Mora M.D.;  Location: SURGERY SAME DAY [facility];  Service: Plastics    MASTECTOMY Left 8/15/2024    Procedure: LEFT TOTAL MASTECTOMY, LEFT SENTINEL LYMPH NODE INJECTION WITH EXCISION OF AXILLARY NODES;  Surgeon: Kallie Dasilva M.D.;  Location: SURGERY SAME DAY [facility];  Service: Plastics    NODE BIOPSY SENTINEL Left 8/15/2024    Procedure: BIOPSY, LYMPH NODE, SENTINEL;  Surgeon: Kallie Dasilva M.D.;  Location: SURGERY SAME DAY [facility];  Service: Plastics    ABDOMINAL HYSTERECTOMY TOTAL         Social History     Tobacco Use    Smoking status: Never     Passive exposure: Never    Smokeless tobacco: Never   Vaping Use    Vaping status: Never Used   Substance Use Topics    Alcohol use: No    Drug use: No        Family History   Problem Relation Age of Onset    Diabetes Father     Heart Disease Father        Allergies as of 12/03/2024 - Reviewed 12/03/2024   Allergen Reaction Noted    Chlorhexidine Rash 10/08/2024         Current Outpatient Medications:     Tirzepatide 5 MG/0.5ML Solution Pen-injector, , Disp: , Rfl:     tamoxifen (NOLVADEX) 20 MG tablet, Take 1 Tablet by mouth every day., Disp: 60 Tablet, Rfl: 3    gabapentin (NEURONTIN) 300 MG Cap, Take 300 mg by mouth 3 times a day. Currently titrating up to 900 mg QHS  1 QHS for 1 day, then 2 QHS, then 3 QHS thereafter 10/8/24 (Patient not taking: Reported on 12/3/2024), Disp: , Rfl:     Ibuprofen (ADVIL) 200 MG Cap, Take  by mouth. (Patient not taking: Reported on 12/3/2024), Disp: , Rfl:     Review of Systems:  ROS       Physical Exam:  Vitals:    12/03/24 0900   BP: 102/72   BP Location: Right arm   Patient Position: Sitting   Pulse: 82   Temp: 36.4 °C (97.5 °F)   TempSrc: Temporal   SpO2: 99%  "  Weight: 84.8 kg (186 lb 13.4 oz)   Height: 1.753 m (5' 9\")       DESC; KARNOFSKY SCALE WITH ECOG EQUIVALENT: 100, Fully active, able to carry on all pre-disease performed without restriction (ECOG equivalent 0)      Physical Exam  Constitutional:       Appearance: Normal appearance.   HENT:      Head: Normocephalic.      Mouth/Throat:      Mouth: Mucous membranes are moist.      Pharynx: Oropharynx is clear.   Eyes:      Extraocular Movements: Extraocular movements intact.      Conjunctiva/sclera: Conjunctivae normal.      Pupils: Pupils are equal, round, and reactive to light.   Cardiovascular:      Rate and Rhythm: Normal rate and regular rhythm.      Pulses: Normal pulses.   Pulmonary:      Effort: Pulmonary effort is normal.      Breath sounds: Normal breath sounds.   Chest:      Comments: Left mastectomy and tissue expander reconstruction without nodularity or erythema.  Right breast is normal without masses nipple discharge or tenderness  Abdominal:      General: Abdomen is flat. Bowel sounds are normal.      Palpations: Abdomen is soft. There is no mass.   Musculoskeletal:         General: Normal range of motion.   Skin:     General: Skin is warm.   Neurological:      General: No focal deficit present.      Mental Status: She is alert and oriented to person, place, and time.   Psychiatric:         Mood and Affect: Mood normal.         Behavior: Behavior normal.            Labs:  No visits with results within 1 Month(s) from this visit.   Latest known visit with results is:   Admission on 08/15/2024, Discharged on 08/15/2024   Component Date Value Ref Range Status    Pathology Request 08/15/2024 Sent to Histo   Final       Imaging:   All listed images below have been independently reviewed by me. I agree with the findings as summarized below:    No results found.     Pathology:      Assessment & Plan:  1.  Intermediate to high-grade DCIS of the left breast measuring 3.6 cm (pT0, pN0) ER +91 to 100%, [place] +10% " status post left mastectomy.  She is a good candidate for risk reduction therapy for her remaining right breast with tamoxifen.  Tolerating tamoxifen with no symptomatology.  2.  Estradiol and FSH levels consistent with premenopausal status.    Plan: Continue tamoxifen.  I will see her back in 6 months for routine follow-up.      Any questions and concerns raised by the patient were answered to the best of my ability. Thank you for allowing me to participate in the care for this patient. Please feel free to contact me for any questions or concerns.     Dwain Devries M.D.

## 2024-12-09 ENCOUNTER — PHYSICAL THERAPY (OUTPATIENT)
Dept: PHYSICAL THERAPY | Facility: REHABILITATION | Age: 44
End: 2024-12-09
Attending: SPECIALIST
Payer: COMMERCIAL

## 2024-12-09 DIAGNOSIS — R29.898 WEAKNESS OF LEFT UPPER EXTREMITY: ICD-10-CM

## 2024-12-09 DIAGNOSIS — D05.12 INTRADUCTAL CARCINOMA IN SITU OF LEFT BREAST: ICD-10-CM

## 2024-12-09 DIAGNOSIS — I89.0 LYMPHEDEMA: ICD-10-CM

## 2024-12-09 DIAGNOSIS — M25.612 DECREASED RANGE OF MOTION OF LEFT SHOULDER: ICD-10-CM

## 2024-12-09 DIAGNOSIS — R68.89 DECREASED ACTIVITY TOLERANCE: ICD-10-CM

## 2024-12-09 PROCEDURE — 97140 MANUAL THERAPY 1/> REGIONS: CPT

## 2024-12-09 PROCEDURE — 97530 THERAPEUTIC ACTIVITIES: CPT

## 2024-12-09 NOTE — OP THERAPY DISCHARGE SUMMARY
Outpatient Physical Therapy  DISCHARGE SUMMARY NOTE      [facility] Physical Therapy [facility]  [address]  [address]  [place] [address]  Phone:  [phone]  Fax:  [phone]    Date of Visit: 12/09/2024    Patient: Valeria Mason  YOB: 1980  MRN: [id number]     Referring Provider: Lisa Collins P.A.-C.  [address]  [address]  [place] [address]   Referring Diagnosis Intraductal carcinoma in situ of left breast [D05.12]             Your patient is being discharged from Physical Therapy with the following comments:   Goals met    Comments:  Valeria has made improvements to her L UE overhead and rotational ROM as well as strength. She is to undergo breast reconstruction later this week. Valeria understands to follow post-op exercise guidelines per her plastic surgeon but has also been educated that once cleared, she can resume her HEP. At this time, she will be DC'd.       Julianne Alfonso, PT, DPT, CLT    Date: 12/9/2024

## 2024-12-09 NOTE — OP THERAPY DAILY TREATMENT
"  Outpatient Physical Therapy  LYMPHEDEMA THERAPY DAILY TREATMENT     [facility] Physical [address]  [address]  [place] [address]  Phone:  [phone]  Fax:  [phone]    Date: 12/09/2024    Patient: Valeria Mason  YOB: 1980  MRN: [id number]     Time Calculation    Start time: 1030  Stop time: 1113 Time Calculation (min): 43 minutes         Chief Complaint: Shoulder Problem    Visit #: 7    Subjective:   History of Present Illness:     Mechanism of injury:  Having more \"shocks\" to her L side lately.       Lymphedema Objective        Therapeutic Exercises (CPT 00428):     1. Foam Roll Sequence without roller, HEP    2. BKFO, HEP    3. wall saul, HEP    4. supine saul    5. wall walk with lift off, HEP    6. sidely shoulder abduction with pink band, HEP    7. doorway saul with pink band, HEP    8. backward spider, HEP    9. shoulder rows with pink band, HEP    10. shoulder ER with pink band, HEP    11. shoulder Y and T with orange band, HEP    12. corner door massage ball, HEP      Therapeutic Exercise Summary: HEP: wall walk with hold, tissue massage to L breast for desensitization    Therapeutic Treatments and Modalities:     1. Manual Therapy (CPT 73279)    2. Therapeutic Activities (CPT 06742)    Therapeutic Treatment and Modalities Summary:   Manual  -Trigger point release to L: forearm extensors, biceps, pec major, pec minor, subscap, supraspinatus, infraspinatus, teres major/minor, traps, levator scap   -L joint mobilization to radius/ulna for interossei, wrist, CMC  -MLD to L breast including parasternal and intercostal collectors. Posteriorly as well.   -MLD to L UE   -ART to L pectorals and subscapularis    Therapeutic Activity  -Pt has been instructed on performance of scar mobilization to her future incisions:  circles, across scar, with scar.  Discussed silicone based scar tape and vitamin E for skin softening at the scar as well.     Time-based " treatments/modalities:    Physical Therapy Timed Treatment Charges  Manual therapy minutes (CPT 73211): 33 minutes  Therapeutic activity minutes (CPT 06397): 10 minutes      Assessment and Plan:   Assessment details:  Valeria has made improvements to her L UE overhead and rotational ROM as well as strength. She is to undergo breast reconstruction later this week. Valeria understands to follow post-op exercise guidelines per her plastic surgeon but has also been educated that once cleared, she can resume her HEP. At this time, she will be DC'd.     Plan:  Therapy options:  No further treatment needed  Discussed with:  Patient

## 2024-12-12 ENCOUNTER — HOSPITAL ENCOUNTER (OUTPATIENT)
Facility: MEDICAL CENTER | Age: 44
End: 2024-12-12
Attending: PLASTIC SURGERY
Payer: COMMERCIAL

## 2024-12-12 PROCEDURE — 88305 TISSUE EXAM BY PATHOLOGIST: CPT

## 2024-12-13 LAB — PATHOLOGY CONSULT NOTE: NORMAL

## 2025-01-07 ENCOUNTER — APPOINTMENT (OUTPATIENT)
Dept: PHYSICAL THERAPY | Facility: REHABILITATION | Age: 45
End: 2025-01-07
Attending: SPECIALIST
Payer: COMMERCIAL

## 2025-01-09 ENCOUNTER — APPOINTMENT (OUTPATIENT)
Dept: PHYSICAL THERAPY | Facility: REHABILITATION | Age: 45
End: 2025-01-09
Attending: SPECIALIST
Payer: COMMERCIAL

## 2025-01-23 ENCOUNTER — APPOINTMENT (OUTPATIENT)
Dept: PHYSICAL THERAPY | Facility: REHABILITATION | Age: 45
End: 2025-01-23
Attending: SPECIALIST
Payer: COMMERCIAL

## 2025-02-06 ENCOUNTER — APPOINTMENT (OUTPATIENT)
Dept: PHYSICAL THERAPY | Facility: REHABILITATION | Age: 45
End: 2025-02-06
Attending: SPECIALIST
Payer: COMMERCIAL

## 2025-02-13 ENCOUNTER — APPOINTMENT (OUTPATIENT)
Dept: PHYSICAL THERAPY | Facility: REHABILITATION | Age: 45
End: 2025-02-13
Attending: SPECIALIST
Payer: COMMERCIAL

## 2025-02-20 ENCOUNTER — APPOINTMENT (OUTPATIENT)
Dept: PHYSICAL THERAPY | Facility: REHABILITATION | Age: 45
End: 2025-02-20
Attending: SPECIALIST
Payer: COMMERCIAL

## 2025-06-05 ENCOUNTER — HOSPITAL ENCOUNTER (OUTPATIENT)
Facility: MEDICAL CENTER | Age: 45
End: 2025-06-05
Attending: INTERNAL MEDICINE
Payer: COMMERCIAL

## 2025-06-05 VITALS
HEART RATE: 74 BPM | TEMPERATURE: 97.7 F | OXYGEN SATURATION: 100 % | BODY MASS INDEX: 25.47 KG/M2 | SYSTOLIC BLOOD PRESSURE: 102 MMHG | RESPIRATION RATE: 12 BRPM | HEIGHT: 69 IN | WEIGHT: 171.96 LBS | DIASTOLIC BLOOD PRESSURE: 64 MMHG

## 2025-06-05 DIAGNOSIS — D05.12 DUCTAL CARCINOMA IN SITU (DCIS) OF LEFT BREAST: Primary | ICD-10-CM

## 2025-06-05 PROCEDURE — 99213 OFFICE O/P EST LOW 20 MIN: CPT | Performed by: INTERNAL MEDICINE

## 2025-06-05 PROCEDURE — 99212 OFFICE O/P EST SF 10 MIN: CPT | Performed by: INTERNAL MEDICINE

## 2025-06-05 ASSESSMENT — ENCOUNTER SYMPTOMS
PSYCHIATRIC NEGATIVE: 1
RESPIRATORY NEGATIVE: 1
NEUROLOGICAL NEGATIVE: 1
EYES NEGATIVE: 1
CARDIOVASCULAR NEGATIVE: 1
GASTROINTESTINAL NEGATIVE: 1
MUSCULOSKELETAL NEGATIVE: 1
CONSTITUTIONAL NEGATIVE: 1

## 2025-06-05 ASSESSMENT — PAIN SCALES - GENERAL: PAINLEVEL_OUTOF10: NO PAIN

## 2025-06-05 NOTE — PROGRESS NOTES
Medical oncology follow-up visit: 6/5/2025      Referring Physician: Kallie Dasilva MD  Primary Care:  Pcp Pt States None    Diagnosis: DCIS of left breast    Chief Complaint: DCIS of the left breast    History of Presenting Illness:  Valeria Mason is a 44 y.o. probable premenopausal white female who had a baseline mammogram on 6/7/2024 which showed calcifications in the entire left lower inner quadrant BI-RADS 0.  She had a diagnostic mammogram with tomosynthesis that was BI-RADS 4 and underwent a stereotactic biopsy of the lower inner quadrant calcifications on 7/10/2024.  Pathology showed intermediate grade DCIS arising in a background of atypical ductal hyperplasia.  ER was +91 to 100%, UT +10%.  She saw Dr. Dasilva and underwent a left total mastectomy and sentinel lymph node biopsy on 8/15/2024.  Pathology showed intermediate high-grade DCIS in the lower inner quadrant with an estimated area of involvement of 36 mm.  All margins were clear.  5 sentinel lymph nodes were negative.  She had immediate tissue expander reconstruction and is undergoing fills now.  Postoperative course was unremarkable.  She has a history of a abdominal hysterectomy without oophorectomy in the past.  She does not have hot flashes or night sweats.  13 gene hereditary breast cancer panel was negative.  With broader panel pending.    Interval history 12/3/2024: She started tamoxifen in October 2024 and is tolerating it extremely well.  She has no hot flashes or other symptomatology associated with it.  Her estradiol and FSH levels were consistent with premenopausal status.  She is getting her permanent left implant later this week with Dr. Mora.    Interval history 6/5/2025: She had reconstructive surgery with permanent implant on the left and right Mamma pexy.  She is somewhat asymmetrical and so she will undergo 1 more surgery with nipple areolar complex reconstruction on the left and additional fat grafting in the right.  Overall she  feels well.  She has no hot flashes or night sweats and is tolerating tamoxifen well except for some occasional memory issues remembering objects.  She can tolerate this.  She is back to baseline physical activity.      Past Medical History:   Diagnosis Date    Cancer (HCC) 7/2024    PONV (postoperative nausea and vomiting) 2018    Nausea after my hysteretomy       Past Surgical History:   Procedure Laterality Date    BREAST RECONSTRUCTION Left 8/15/2024    Procedure: LEFT BREAST RECONSTRUCTION WITH PLACEMENT OF TISSUE EXPANDER AND USE OF ACELLULAR DERMAL MATRIX;  Surgeon: Bal Mora M.D.;  Location: SURGERY SAME DAY [facility];  Service: Plastics    TISSUE EXPANDER PLACE/REMOVE Left 8/15/2024    Procedure: INSERTION, TISSUE EXPANDER;  Surgeon: Bal Mora M.D.;  Location: SURGERY SAME DAY [facility];  Service: Plastics    MASTECTOMY Left 8/15/2024    Procedure: LEFT TOTAL MASTECTOMY, LEFT SENTINEL LYMPH NODE INJECTION WITH EXCISION OF AXILLARY NODES;  Surgeon: Kallie Dasilva M.D.;  Location: SURGERY SAME DAY [facility];  Service: Plastics    NODE BIOPSY SENTINEL Left 8/15/2024    Procedure: BIOPSY, LYMPH NODE, SENTINEL;  Surgeon: Kallie Dasilva M.D.;  Location: SURGERY SAME DAY [facility];  Service: Plastics    ABDOMINAL HYSTERECTOMY TOTAL         Social History     Tobacco Use    Smoking status: Never     Passive exposure: Never    Smokeless tobacco: Never   Vaping Use    Vaping status: Never Used   Substance Use Topics    Alcohol use: No    Drug use: No        Family History   Problem Relation Age of Onset    Diabetes Father     Heart Disease Father        Allergies as of 06/05/2025 - Reviewed 06/05/2025   Allergen Reaction Noted    Chlorhexidine Rash 10/08/2024         Current Outpatient Medications:     tamoxifen (NOLVADEX) 20 MG tablet, Take 1 Tablet by mouth every day., Disp: 90 Tablet, Rfl: 3    Tirzepatide 5 MG/0.5ML Solution Pen-injector, , Disp: , Rfl:     Review of Systems:  Review of Systems  "  Constitutional: Negative.    HENT: Negative.     Eyes: Negative.    Respiratory: Negative.     Cardiovascular: Negative.    Gastrointestinal: Negative.    Genitourinary: Negative.    Musculoskeletal: Negative.    Skin: Negative.    Neurological: Negative.    Endo/Heme/Allergies: Negative.    Psychiatric/Behavioral: Negative.            Physical Exam:  Vitals:    06/05/25 0938   BP: 102/64   BP Location: Left arm   Patient Position: Sitting   BP Cuff Size: Adult   Pulse: 74   Resp: 12   Temp: 36.5 °C (97.7 °F)   TempSrc: Temporal   SpO2: 100%   Weight: 78 kg (171 lb 15.3 oz)   Height: 1.753 m (5' 9\")       DESC; KARNOFSKY SCALE WITH ECOG EQUIVALENT: 100, Fully active, able to carry on all pre-disease performed without restriction (ECOG equivalent 0)      Physical Exam  Constitutional:       Appearance: Normal appearance.   HENT:      Head: Normocephalic.      Mouth/Throat:      Mouth: Mucous membranes are moist.      Pharynx: Oropharynx is clear.   Eyes:      Extraocular Movements: Extraocular movements intact.      Conjunctiva/sclera: Conjunctivae normal.      Pupils: Pupils are equal, round, and reactive to light.   Cardiovascular:      Rate and Rhythm: Normal rate and regular rhythm.      Pulses: Normal pulses.   Pulmonary:      Effort: Pulmonary effort is normal.      Breath sounds: Normal breath sounds.   Chest:      Comments: Left permanent implant after mastectomy without nodularity or erythema.  Right breast shows Mamma pexy without masses nipple discharge or tenderness.  Abdominal:      General: Abdomen is flat. Bowel sounds are normal.      Palpations: Abdomen is soft. There is no mass.   Musculoskeletal:         General: Normal range of motion.   Skin:     General: Skin is warm.   Neurological:      General: No focal deficit present.      Mental Status: She is alert and oriented to person, place, and time.   Psychiatric:         Mood and Affect: Mood normal.         Behavior: Behavior normal.      "       Labs:  No visits with results within 1 Month(s) from this visit.   Latest known visit with results is:   Admission on 08/15/2024, Discharged on 08/15/2024   Component Date Value Ref Range Status    Pathology Request 08/15/2024 Sent to Histo   Final       Imaging:   All listed images below have been independently reviewed by me. I agree with the findings as summarized below:    No results found.     Pathology:      Assessment & Plan:  1.  Intermediate to high-grade DCIS of the left breast measuring 3.6 cm (pT0, pN0) ER +91 to 100%, NV +10% status post left mastectomy and reconstruction.  She is a good candidate for risk reduction therapy for her remaining right breast with tamoxifen.  Tolerating tamoxifen well  2.  Estradiol and FSH levels consistent with premenopausal status.    Plan: Continue tamoxifen.  We will obtain a right sided mammogram.  I will see her back in 6 months for routine follow-up.      Any questions and concerns raised by the patient were answered to the best of my ability. Thank you for allowing me to participate in the care for this patient. Please feel free to contact me for any questions or concerns.     Dwain Devries M.D.

## 2025-06-22 NOTE — PROGRESS NOTES
"            Primary Care Provider Fany De Leon M.D.   Referring Provider: Kallie Dasilva MD   Surgical Oncologist: Kallie Dasilva MD   Medical Oncologist: Dwain Devries MD    Physical Therapy: Julianne Alfonso    HPI:  44 y.o.    Patient following up for wellness / lifestyle medicine care after diagnosis   DCIS 7/10/2024  L total mastectomy [facility] 8/15/2024   Genetics negative   H/o hysterectomy   Tamoxifen per Dr Devries     Co-morbidites: obesity - treated with tirzepatide    This very delightful patient presents today wishing to discuss a couple questions that have come up recently.  She complains of headaches which started several weeks ago.  She is wondering if these are related to the tamoxifen.  She started tamoxifen in September the headaches started maybe in April or May.  She describes them as almost daily, not severe just annoying, they sometimes feel \"ocular\" and are unilateral but not on the same side consistently.  Some other days they are more just frontal.  They are not related to stress.  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  They do not seem cyclical although she has had a hysterectomy.  She denies any nausea, vomiting, light or sound sensitivity.  She is staying well-hydrated, she has added electrolytes to her water, they will typically improve with some Advil.  She is just wondering if they are due to the tamoxifen or not.    The patient's other issue is she states her \"sex drive is 0\".  She states this also seems to have worsened since her DCIS diagnosis.  She states that in the past she did rely on breast stimulation for arousal and her breast just feels significantly different.  She states there are some body image issues with this surgery.  She denies any significant pain with sex she states that they do use a ton of lubrication.  She does have an excellent marriage and is very happy.  She also states she has been " "able to become aroused as usual but has not had an orgasm since her diagnosis last fall.  She states she will come very close but now she feels that some of it is \"in her head\"      Valeria Mason has a current medication list which includes the following prescription(s): tamoxifen and tirzepatide.     Problem List[1]     Past Surgical History[2]     Social History[3]     Family History   Problem Relation Age of Onset    Diabetes Father     Heart Disease Father         Valeria Mason is allergic to chlorhexidine.   Ambulatory Vitals  Encounter Vitals  Blood Pressure: 105/55  Pulse: 88  Respiration: 16  Pulse Oximetry: 93 %  Weight: 80.6 kg (177 lb 12.8 oz)  Height: 175.3 cm (5' 9\")  BMI (Calculated): 26.26  Pain Score: No pain    Physical Exam  Vitals reviewed.   Constitutional:       Appearance: Normal appearance.   Skin:     General: Skin is warm.   Neurological:      General: No focal deficit present.      Mental Status: She is alert and oriented to person, place, and time.   Psychiatric:         Mood and Affect: Mood normal.         Behavior: Behavior normal.         Thought Content: Thought content normal.          1. Ductal carcinoma in situ (DCIS) of left breast    2. Hypoactive sexual desire disorder       We reviewed the goal of the oncology wellness clinic is to assist her in optimizing health, sense of well-being, reducing recurrence risk, and improving quality of life after a cancer diagnosis. In the situation of those without a cancer diagnosis but at high risk, our focus is on lifestyle focused prevention strategies. We identified the following areas which are currently identified as priorities to work on by this patient:     Our current plan includes:   Regarding her headaches, I discussed continuing good hydration, but ultimately feel that this is something that could possibly be related to the tamoxifen.  I will message Dr. Devries.  I also suggest that she speak to her primary care as " well.    Sexual Health Plan:   We discussed the complex nature of hypoactive sexual desire disorder in women after breast cancer diagnosis.  The changes in body image, emotional health, the use of tamoxifen, the mastectomy, all can delay overall making this very multifactorial.  I encouraged continued use of lubrication.  We discussed ways to enhance arousal as well as orgasmic function, the use of CBD products, the use of vibration including clitoral vibration.  The importance of her spending some time becoming comfortable with her body after this diagnosis and understanding what it responds to, and then communicating this to her  is important.  We did discuss the use of FDA approved medications such as Addyi or Vyleesi.  We also discussed the use of erotic material in order to improve and enhance her intimate relationship as well.  She is going to experiment and explore some of these options and then will return as needed.    Total time spent today, including personal review of past history, face to face time, chart documentation, and  was 45  minutes.            [1]   Patient Active Problem List  Diagnosis    Encounter to establish care with new doctor    Class 1 obesity due to excess calories without serious comorbidity with body mass index (BMI) of 30.0 to 30.9 in adult    Excessive sweating    Motion sickness    Carcinoma in situ of left breast   [2]   Past Surgical History:  Procedure Laterality Date    BREAST RECONSTRUCTION Left 8/15/2024    Procedure: LEFT BREAST RECONSTRUCTION WITH PLACEMENT OF TISSUE EXPANDER AND USE OF ACELLULAR DERMAL MATRIX;  Surgeon: Bal Mora M.D.;  Location: SURGERY SAME DAY [facility];  Service: Plastics    TISSUE EXPANDER PLACE/REMOVE Left 8/15/2024    Procedure: INSERTION, TISSUE EXPANDER;  Surgeon: Bal Mora M.D.;  Location: SURGERY SAME DAY [facility];  Service: Plastics    MASTECTOMY Left 8/15/2024    Procedure: LEFT TOTAL MASTECTOMY, LEFT  SENTINEL LYMPH NODE INJECTION WITH EXCISION OF AXILLARY NODES;  Surgeon: Kallie Dasilva M.D.;  Location: SURGERY SAME DAY [facility];  Service: Plastics    NODE BIOPSY SENTINEL Left 8/15/2024    Procedure: BIOPSY, LYMPH NODE, SENTINEL;  Surgeon: Kallie Dasilva M.D.;  Location: SURGERY SAME DAY [facility];  Service: Plastics    ABDOMINAL HYSTERECTOMY TOTAL     [3]   Social History  Tobacco Use    Smoking status: Never     Passive exposure: Never    Smokeless tobacco: Never   Vaping Use    Vaping status: Never Used   Substance Use Topics    Alcohol use: No    Drug use: No

## 2025-06-23 ENCOUNTER — OFFICE VISIT (OUTPATIENT)
Age: 45
End: 2025-06-23
Payer: COMMERCIAL

## 2025-06-23 VITALS
DIASTOLIC BLOOD PRESSURE: 55 MMHG | WEIGHT: 177.8 LBS | HEIGHT: 69 IN | HEART RATE: 88 BPM | SYSTOLIC BLOOD PRESSURE: 105 MMHG | BODY MASS INDEX: 26.33 KG/M2 | OXYGEN SATURATION: 93 % | RESPIRATION RATE: 16 BRPM

## 2025-06-23 DIAGNOSIS — D05.12 DUCTAL CARCINOMA IN SITU (DCIS) OF LEFT BREAST: Primary | ICD-10-CM

## 2025-06-23 DIAGNOSIS — F52.0 HYPOACTIVE SEXUAL DESIRE DISORDER: ICD-10-CM

## 2025-06-23 PROCEDURE — 99215 OFFICE O/P EST HI 40 MIN: CPT | Performed by: OBSTETRICS & GYNECOLOGY

## 2025-06-23 ASSESSMENT — PAIN SCALES - GENERAL: PAINLEVEL_OUTOF10: NO PAIN

## (undated) DEVICE — DRESSING ANTIMICROBIAL BIOPATCH 4.0M (40EA/CA)

## (undated) DEVICE — COVER LIGHT HANDLE FLEXIBLE - SOFT (2EA/PK 80PK/CA)

## (undated) DEVICE — CANISTER SUCTION 3000ML MECHANICAL FILTER AUTO SHUTOFF MEDI-VAC NONSTERILE LF DISP (40EA/CA)

## (undated) DEVICE — SYRINGE NON SAFETY 10 CC 21 GA X 1-1/2 IN (100/BX 4BX/CA)

## (undated) DEVICE — SUCTION INSTRUMENT YANKAUER BULBOUS TIP W/O VENT (50EA/CA)

## (undated) DEVICE — SOLUTION PREP PVP IODINE 3/4 OZ POUCH PACKET CONTAINER STERILE LATEX FREE

## (undated) DEVICE — MEDICINE CUP STERILE 2 OZ - (100/CA)

## (undated) DEVICE — CANISTER SUCTION RIGID RED 1500CC (40EA/CA)

## (undated) DEVICE — GLOVE BIOGEL SZ 8 SURGICAL PF LTX - (50PR/BX 4BX/CA)

## (undated) DEVICE — KIT SPY-PHI DRUG DRAPE (6EA/BX)

## (undated) DEVICE — BINDER BREAST FLORAL LAVENDER XL 40-45"

## (undated) DEVICE — RESERVOIR SUCTION 100 CC - SILICONE (20EA/CA)

## (undated) DEVICE — PEN SKIN MARKER W/RULER - (50EA/BX)

## (undated) DEVICE — CHLORAPREP 26 ML APPLICATOR - ORANGE TINT(25/CA)

## (undated) DEVICE — SPONGE XRAY 8X4 STERL. 12PL - (10EA/TY 80TY/CA)

## (undated) DEVICE — MASK AIRWAY SIZE 4 UNIQUE SILICON (10EA/BX)

## (undated) DEVICE — TUBE CONNECTING SUCTION - CLEAR PLASTIC STERILE 72 IN (50EA/CA)

## (undated) DEVICE — DRESSING ABDOMINAL PAD STERILE 8 X 10" (360EA/CA)"

## (undated) DEVICE — SODIUM CHL IRRIGATION 0.9% 1000ML (12EA/CA)

## (undated) DEVICE — SUTURE GENERAL

## (undated) DEVICE — SUTURE 3-0 ETHILON FS-1 - (36/BX) 30 INCH

## (undated) DEVICE — SYSTEM PREVENA INCISION MNGM - (1/EA)

## (undated) DEVICE — SHEET TRANSVERSE LAP - (12EA/CA)

## (undated) DEVICE — LACTATED RINGERS INJ 1000 ML - (14EA/CA 60CA/PF)

## (undated) DEVICE — SUTURE 2-0 VICRYL PLUS SH - 8 X 18 INCH (12/BX)

## (undated) DEVICE — TOWEL STOP TIMEOUT SAFETY FLAG (40EA/CA)

## (undated) DEVICE — SLEEVE VASO CALF MED - (10PR/CA)

## (undated) DEVICE — PAD MAGNETIC INSTRUMENT FOAM HOLDER (200/CA)

## (undated) DEVICE — SUTURE 3-0 VICRYL PLUS SH - 8X 18 INCH (12/BX)

## (undated) DEVICE — ELECTRODE DUAL RETURN W/ CORD - (50/PK)

## (undated) DEVICE — SUTURE 2-0 ETHIBOND CT-2 (12PK/BX)

## (undated) DEVICE — DEVICE MONOPOLAR RF PEAK PLASMABLADE 3.0S

## (undated) DEVICE — SYRINGE DISP. 50CC LS - (40/BX)

## (undated) DEVICE — MASK OXYGEN VNYL ADLT MED CONC WITH 7 FOOT TUBING - (50EA/CA)

## (undated) DEVICE — KIT  I.V. START (100EA/CA)

## (undated) DEVICE — SUTURE 4-0 MONOCRYL PLUS PS-2 - 27 INCH (36/BX)

## (undated) DEVICE — PACK BREAST RECONSTRUCTION (2EA/CA)

## (undated) DEVICE — SET LEADWIRE 5 LEAD BEDSIDE DISPOSABLE ECG (1SET OF 5/EA)

## (undated) DEVICE — GLOVE SZ 6.5 BIOGEL PI MICRO - PF LF (50PR/BX)

## (undated) DEVICE — GLOVE BIOGEL PI INDICATOR SZ 7.0 SURGICAL PF LF - (50/BX 4BX/CA)

## (undated) DEVICE — GLOVE BIOGEL INDICATOR SZ 8.5 SURGICAL PF LTX - (50/BX 4BX/CA)

## (undated) DEVICE — LUBRICANT INSTRUMENT 4 ML ELECTRO LUBE (20EA/BX)

## (undated) DEVICE — SENSOR OXIMETER ADULT SPO2 RD SET (20EA/BX)

## (undated) DEVICE — PAD LAP STERILE 18 X 18 - (5/PK 40PK/CA)

## (undated) DEVICE — GLOVE BIOGEL SZ 6 PF LATEX - (50EA/BX 4BX/CA)

## (undated) DEVICE — GOWN WARMING STANDARD FLEX - (30/CA)

## (undated) DEVICE — COVER CIV-FLEX TRANSDUCER - (24/BX)

## (undated) DEVICE — PLUMEPEN ULTRA 3/8 IN X 10 FT HOSE (20EA/CA)

## (undated) DEVICE — SET MULTI-ADD FLUID TRANSFER 42 INCH - (50/CA)THIS IS A CUSTOM MADE ITEM 4 TO 6 WEEK LEAD TIME

## (undated) DEVICE — GLOVE BIOGEL INDICATOR SZ 6.5 SURGICAL PF LTX - (50PR/BX 4BX/CA)

## (undated) DEVICE — SUTURE 3-0 MONOCRYL PLUS PS-2 - (12/BX)

## (undated) DEVICE — DRAPE LARGE 3 QUARTER - (20/CA)

## (undated) DEVICE — CANNULA O2 COMFORT SOFT EAR ADULT 7 FT TUBING (50/CA)

## (undated) DEVICE — DRAIN JACKSON PRATT 15FR - (10EA/CA)

## (undated) DEVICE — TUBING CLEARLINK DUO-VENT - C-FLO (48EA/CA)